# Patient Record
Sex: FEMALE | Race: WHITE | NOT HISPANIC OR LATINO | Employment: OTHER | URBAN - METROPOLITAN AREA
[De-identification: names, ages, dates, MRNs, and addresses within clinical notes are randomized per-mention and may not be internally consistent; named-entity substitution may affect disease eponyms.]

---

## 2023-08-22 ENCOUNTER — HOSPITAL ENCOUNTER (OUTPATIENT)
Dept: DATA CONVERSION | Facility: HOSPITAL | Age: 78
Discharge: HOME | End: 2023-08-22
Payer: MEDICARE

## 2023-08-22 DIAGNOSIS — K85.10 BILIARY ACUTE PANCREATITIS WITHOUT NECROSIS OR INFECTION (HHS-HCC): ICD-10-CM

## 2023-08-30 ENCOUNTER — HOSPITAL ENCOUNTER (OUTPATIENT)
Dept: DATA CONVERSION | Facility: HOSPITAL | Age: 78
Discharge: HOME | End: 2023-08-30
Payer: MEDICARE

## 2023-08-30 DIAGNOSIS — K80.20 CALCULUS OF GALLBLADDER WITHOUT CHOLECYSTITIS WITHOUT OBSTRUCTION: ICD-10-CM

## 2023-09-07 ENCOUNTER — HOSPITAL ENCOUNTER (OUTPATIENT)
Dept: DATA CONVERSION | Facility: HOSPITAL | Age: 78
Discharge: HOME | End: 2023-09-07
Payer: MEDICARE

## 2023-09-07 DIAGNOSIS — E66.9 OBESITY, UNSPECIFIED: ICD-10-CM

## 2023-09-07 DIAGNOSIS — Z72.0 TOBACCO USE: ICD-10-CM

## 2023-09-07 DIAGNOSIS — K80.10 CALCULUS OF GALLBLADDER WITH CHRONIC CHOLECYSTITIS WITHOUT OBSTRUCTION: ICD-10-CM

## 2023-09-07 DIAGNOSIS — F32.A DEPRESSION, UNSPECIFIED: ICD-10-CM

## 2023-09-07 DIAGNOSIS — K80.20 CALCULUS OF GALLBLADDER WITHOUT CHOLECYSTITIS WITHOUT OBSTRUCTION: ICD-10-CM

## 2023-09-07 DIAGNOSIS — I10 ESSENTIAL (PRIMARY) HYPERTENSION: ICD-10-CM

## 2023-09-10 PROBLEM — H61.21 IMPACTED CERUMEN OF RIGHT EAR: Status: ACTIVE | Noted: 2023-09-10

## 2023-09-10 PROBLEM — R60.9 EDEMA: Status: ACTIVE | Noted: 2023-09-10

## 2023-09-10 PROBLEM — R05.9 COUGH: Status: ACTIVE | Noted: 2023-09-10

## 2023-09-10 PROBLEM — F32.A DEPRESSION: Status: ACTIVE | Noted: 2023-09-10

## 2023-09-10 PROBLEM — H25.813 COMBINED FORM OF SENILE CATARACT OF BOTH EYES: Status: ACTIVE | Noted: 2023-09-10

## 2023-09-10 PROBLEM — H18.829 DISORDER OF CORNEA DUE TO CONTACT LENS: Status: ACTIVE | Noted: 2023-09-10

## 2023-09-10 PROBLEM — J01.90 ACUTE SINUSITIS: Status: ACTIVE | Noted: 2023-09-10

## 2023-09-10 PROBLEM — J45.909 ASTHMA (HHS-HCC): Status: ACTIVE | Noted: 2023-09-10

## 2023-09-10 PROBLEM — K21.9 GERD (GASTROESOPHAGEAL REFLUX DISEASE): Status: ACTIVE | Noted: 2023-09-10

## 2023-09-10 PROBLEM — H43.819 VITREOUS DEGENERATION: Status: ACTIVE | Noted: 2023-09-10

## 2023-09-10 PROBLEM — E78.00 PURE HYPERCHOLESTEROLEMIA: Status: ACTIVE | Noted: 2023-09-10

## 2023-09-10 PROBLEM — G47.00 INSOMNIA: Status: ACTIVE | Noted: 2023-09-10

## 2023-09-10 PROBLEM — I12.9 CHRONIC KIDNEY DISEASE DUE TO HYPERTENSION: Status: ACTIVE | Noted: 2023-09-10

## 2023-09-10 PROBLEM — J44.9 CHRONIC OBSTRUCTIVE PULMONARY DISEASE (MULTI): Status: ACTIVE | Noted: 2023-09-10

## 2023-09-10 PROBLEM — J30.2 SEASONAL ALLERGIC RHINITIS: Status: ACTIVE | Noted: 2023-09-10

## 2023-09-10 PROBLEM — M19.90 DEGENERATIVE JOINT DISEASE: Status: ACTIVE | Noted: 2023-09-10

## 2023-09-10 PROBLEM — N95.9 MENOPAUSAL AND POSTMENOPAUSAL DISORDER: Status: ACTIVE | Noted: 2023-09-10

## 2023-09-10 PROBLEM — R22.1 NECK MASS: Status: ACTIVE | Noted: 2023-09-10

## 2023-09-10 PROBLEM — H52.7 UNSPECIFIED DISORDER OF REFRACTION: Status: ACTIVE | Noted: 2023-09-10

## 2023-09-10 PROBLEM — F41.9 ANXIETY: Status: ACTIVE | Noted: 2023-09-10

## 2023-09-10 PROBLEM — H35.371 PUCKERING OF MACULA, RIGHT EYE: Status: ACTIVE | Noted: 2023-09-10

## 2023-09-10 PROBLEM — I10 BENIGN HYPERTENSION: Status: ACTIVE | Noted: 2023-09-10

## 2023-09-10 PROBLEM — R73.01 IMPAIRED FASTING GLUCOSE: Status: ACTIVE | Noted: 2023-09-10

## 2023-09-10 PROBLEM — D37.030 NEOPLASM OF UNCERTAIN BEHAVIOR OF PAROTID GLAND: Status: ACTIVE | Noted: 2021-07-10

## 2023-09-10 RX ORDER — VIT C/E/ZN/COPPR/LUTEIN/ZEAXAN 250MG-90MG
1 CAPSULE ORAL DAILY PRN
COMMUNITY

## 2023-09-10 RX ORDER — CALCIUM CARBONATE 200(500)MG
TABLET,CHEWABLE ORAL
COMMUNITY

## 2023-09-10 RX ORDER — PRAVASTATIN SODIUM 40 MG/1
1 TABLET ORAL DAILY
COMMUNITY
Start: 2013-07-19 | End: 2024-01-29

## 2023-09-10 RX ORDER — ALBUTEROL SULFATE 90 UG/1
2 AEROSOL, METERED RESPIRATORY (INHALATION) EVERY 4 HOURS PRN
COMMUNITY

## 2023-09-10 RX ORDER — METHYLPREDNISOLONE 4 MG/1
TABLET ORAL
COMMUNITY
Start: 2021-10-19

## 2023-09-10 RX ORDER — AZITHROMYCIN 250 MG/1
TABLET, FILM COATED ORAL
COMMUNITY

## 2023-09-10 RX ORDER — AZELASTINE 1 MG/ML
1 SPRAY, METERED NASAL 2 TIMES DAILY
COMMUNITY
Start: 2022-07-20

## 2023-09-10 RX ORDER — PREDNISONE 10 MG/1
TABLET ORAL
COMMUNITY

## 2023-09-10 RX ORDER — LORATADINE 10 MG/1
1 TABLET ORAL DAILY
COMMUNITY
Start: 2021-04-21

## 2023-09-10 RX ORDER — KETOCONAZOLE 20 MG/G
CREAM TOPICAL
COMMUNITY

## 2023-09-10 RX ORDER — NAPROXEN SODIUM 220 MG
2 TABLET ORAL EVERY 12 HOURS
COMMUNITY

## 2023-09-10 RX ORDER — OMEPRAZOLE 20 MG/1
1 CAPSULE, DELAYED RELEASE ORAL DAILY
COMMUNITY

## 2023-09-10 RX ORDER — SERTRALINE HYDROCHLORIDE 50 MG/1
1 TABLET, FILM COATED ORAL DAILY
COMMUNITY
Start: 2020-06-26 | End: 2024-01-29

## 2023-09-10 RX ORDER — HYDROCHLOROTHIAZIDE 12.5 MG/1
1 TABLET ORAL DAILY
COMMUNITY
Start: 2015-02-11 | End: 2024-02-02

## 2023-09-10 RX ORDER — BUDESONIDE AND FORMOTEROL FUMARATE DIHYDRATE 160; 4.5 UG/1; UG/1
2 AEROSOL RESPIRATORY (INHALATION)
COMMUNITY
Start: 2021-11-02

## 2023-09-10 RX ORDER — OMEPRAZOLE 20 MG/1
1 TABLET, DELAYED RELEASE ORAL DAILY
COMMUNITY
Start: 2021-02-15

## 2023-09-10 RX ORDER — FLUTICASONE PROPIONATE 50 MCG
1 SPRAY, SUSPENSION (ML) NASAL EVERY MORNING
COMMUNITY
Start: 2022-07-20

## 2023-09-10 RX ORDER — LOSARTAN POTASSIUM 50 MG/1
1 TABLET ORAL DAILY
COMMUNITY
End: 2024-01-29

## 2023-09-10 RX ORDER — ACETAMINOPHEN 500 MG
TABLET ORAL
COMMUNITY

## 2023-09-10 RX ORDER — BENZONATATE 100 MG/1
1 CAPSULE ORAL 3 TIMES DAILY PRN
COMMUNITY

## 2024-01-29 DIAGNOSIS — F41.9 ANXIETY: ICD-10-CM

## 2024-01-29 DIAGNOSIS — E78.00 PURE HYPERCHOLESTEROLEMIA: ICD-10-CM

## 2024-01-29 DIAGNOSIS — I10 BENIGN HYPERTENSION: ICD-10-CM

## 2024-01-29 RX ORDER — SERTRALINE HYDROCHLORIDE 50 MG/1
50 TABLET, FILM COATED ORAL DAILY
Qty: 90 TABLET | Refills: 3 | Status: SHIPPED | OUTPATIENT
Start: 2024-01-29 | End: 2024-03-08 | Stop reason: SDUPTHER

## 2024-01-29 RX ORDER — LOSARTAN POTASSIUM 50 MG/1
50 TABLET ORAL DAILY
Qty: 90 TABLET | Refills: 3 | Status: SHIPPED | OUTPATIENT
Start: 2024-01-29 | End: 2024-03-08 | Stop reason: SDUPTHER

## 2024-01-29 RX ORDER — PRAVASTATIN SODIUM 40 MG/1
40 TABLET ORAL DAILY
Qty: 90 TABLET | Refills: 3 | Status: SHIPPED | OUTPATIENT
Start: 2024-01-29 | End: 2024-03-08 | Stop reason: SDUPTHER

## 2024-02-02 DIAGNOSIS — I12.9 CHRONIC KIDNEY DISEASE DUE TO HYPERTENSION: ICD-10-CM

## 2024-02-02 RX ORDER — HYDROCHLOROTHIAZIDE 12.5 MG/1
12.5 TABLET ORAL DAILY
Qty: 90 TABLET | Refills: 2 | Status: SHIPPED | OUTPATIENT
Start: 2024-02-02 | End: 2024-03-08 | Stop reason: SDUPTHER

## 2024-02-16 ENCOUNTER — TELEPHONE (OUTPATIENT)
Dept: PRIMARY CARE | Facility: CLINIC | Age: 79
End: 2024-02-16
Payer: MEDICARE

## 2024-02-16 NOTE — TELEPHONE ENCOUNTER
Pt c/o arthritis pain in left shoulder and arm x3 weeks.  Cannot put coat on, trouble sleeping.  Wondering if could try meloxicam or what you recommend.  Please advise.  Ph: [phone]

## 2024-02-26 ENCOUNTER — HOSPITAL ENCOUNTER (OUTPATIENT)
Dept: RADIOLOGY | Facility: CLINIC | Age: 79
Discharge: HOME | End: 2024-02-26
Payer: MEDICARE

## 2024-02-26 DIAGNOSIS — M25.512 LEFT SHOULDER PAIN, UNSPECIFIED CHRONICITY: ICD-10-CM

## 2024-02-26 PROCEDURE — 73030 X-RAY EXAM OF SHOULDER: CPT | Mod: LEFT SIDE | Performed by: RADIOLOGY

## 2024-02-26 PROCEDURE — 73030 X-RAY EXAM OF SHOULDER: CPT | Mod: LT

## 2024-02-27 ENCOUNTER — OFFICE VISIT (OUTPATIENT)
Dept: ORTHOPEDIC SURGERY | Facility: CLINIC | Age: 79
End: 2024-02-27
Payer: MEDICARE

## 2024-02-27 DIAGNOSIS — Z96.653 HISTORY OF TOTAL BILATERAL KNEE REPLACEMENT: ICD-10-CM

## 2024-02-27 DIAGNOSIS — J44.9 CHRONIC OBSTRUCTIVE PULMONARY DISEASE, UNSPECIFIED COPD TYPE (MULTI): ICD-10-CM

## 2024-02-27 DIAGNOSIS — I10 BENIGN HYPERTENSION: ICD-10-CM

## 2024-02-27 DIAGNOSIS — I12.9 CHRONIC KIDNEY DISEASE DUE TO HYPERTENSION: ICD-10-CM

## 2024-02-27 DIAGNOSIS — E78.00 PURE HYPERCHOLESTEROLEMIA: ICD-10-CM

## 2024-02-27 DIAGNOSIS — M25.512 LEFT SHOULDER PAIN, UNSPECIFIED CHRONICITY: ICD-10-CM

## 2024-02-27 DIAGNOSIS — M19.012 PRIMARY OSTEOARTHRITIS OF LEFT SHOULDER: Primary | ICD-10-CM

## 2024-02-27 PROCEDURE — 2500000004 HC RX 250 GENERAL PHARMACY W/ HCPCS (ALT 636 FOR OP/ED): Performed by: ORTHOPAEDIC SURGERY

## 2024-02-27 PROCEDURE — 99214 OFFICE O/P EST MOD 30 MIN: CPT | Performed by: ORTHOPAEDIC SURGERY

## 2024-02-27 PROCEDURE — 1160F RVW MEDS BY RX/DR IN RCRD: CPT | Performed by: ORTHOPAEDIC SURGERY

## 2024-02-27 PROCEDURE — 20611 DRAIN/INJ JOINT/BURSA W/US: CPT | Performed by: ORTHOPAEDIC SURGERY

## 2024-02-27 PROCEDURE — 2500000005 HC RX 250 GENERAL PHARMACY W/O HCPCS: Performed by: ORTHOPAEDIC SURGERY

## 2024-02-27 PROCEDURE — 1159F MED LIST DOCD IN RCRD: CPT | Performed by: ORTHOPAEDIC SURGERY

## 2024-02-27 PROCEDURE — 1125F AMNT PAIN NOTED PAIN PRSNT: CPT | Performed by: ORTHOPAEDIC SURGERY

## 2024-02-27 PROCEDURE — 99204 OFFICE O/P NEW MOD 45 MIN: CPT | Performed by: ORTHOPAEDIC SURGERY

## 2024-02-27 RX ORDER — LIDOCAINE HYDROCHLORIDE 10 MG/ML
0.5 INJECTION INFILTRATION; PERINEURAL
Status: COMPLETED | OUTPATIENT
Start: 2024-02-27 | End: 2024-02-27

## 2024-02-27 RX ORDER — TRIAMCINOLONE ACETONIDE 40 MG/ML
40 INJECTION, SUSPENSION INTRA-ARTICULAR; INTRAMUSCULAR
Status: COMPLETED | OUTPATIENT
Start: 2024-02-27 | End: 2024-02-27

## 2024-02-27 RX ADMIN — LIDOCAINE HYDROCHLORIDE 0.5 ML: 10 INJECTION, SOLUTION INFILTRATION; PERINEURAL at 08:31

## 2024-02-27 RX ADMIN — TRIAMCINOLONE ACETONIDE 40 MG: 40 INJECTION, SUSPENSION INTRA-ARTICULAR; INTRAMUSCULAR at 08:31

## 2024-02-27 ASSESSMENT — ENCOUNTER SYMPTOMS
FEVER: 0
WHEEZING: 0
ARTHRALGIAS: 1
CHILLS: 0
SHORTNESS OF BREATH: 0
FATIGUE: 0

## 2024-02-27 ASSESSMENT — PAIN SCALES - GENERAL: PAINLEVEL_OUTOF10: 8

## 2024-02-27 ASSESSMENT — PAIN - FUNCTIONAL ASSESSMENT: PAIN_FUNCTIONAL_ASSESSMENT: 0-10

## 2024-02-27 NOTE — PROGRESS NOTES
Reason for Appointment  Chief Complaint   Patient presents with    Left Shoulder - Pain     History of Present Illness  New patient is a 79 y.o. female here today for evaluation of left shoulder pain. X-rays today show severe osteoarthritis with a well centered humeral head. She has posterior shoulder pain that is worse when she raises the arm. Occasionally she has pain that shoots from the neck and into the thumb in the C6 region. Her shoulder has been bothering her for a while. Pain prevents her from overhead activities. She has a history of bilateral knee replacements.     Past Medical History:   Diagnosis Date    Personal history of other diseases of the circulatory system     History of hypertension       Past Surgical History:   Procedure Laterality Date    OTHER SURGICAL HISTORY  04/19/2021    Oral surgery    OTHER SURGICAL HISTORY  04/19/2021    Knee surgery       Medication Documentation Review Audit       Reviewed by Shelley Mccabe MA (Medical Assistant) on 02/27/24 at 0810      Medication Order Taking? Sig Documenting Provider Last Dose Status   acetaminophen (Tylenol) 325 mg tablet [id number] Yes TAKE 2 TABLETS BY MOUTH EVERY 4 HOURS AS NEEDED FOR PAIN Owen Sierra MD Taking Active   acetaminophen (Tylenol) 500 mg tablet [id number] Yes Take by mouth. Historical Provider, MD Taking Active   albuterol 90 mcg/actuation inhaler [id number] Yes Inhale 2 puffs every 4 hours if needed. Historical Provider, MD Taking Active   azelastine (Astelin) 137 mcg (0.1 %) nasal spray [id number] Yes Administer 1 spray into each nostril 2 times a day. Historical Provider, MD Taking Active   azithromycin (Zithromax) 250 mg tablet [id number] Yes TK 1 T PO Q MON WED AND FRI Historical Provider, MD Taking Active   benzonatate (Tessalon) 100 mg capsule [id number] Yes Take 1 capsule (100 mg) by mouth 3 times a day as needed. Historical Provider, MD Taking Active   budesonide-formoteroL (Symbicort) 160-4.5 mcg/actuation inhaler  [id number] Yes Inhale 2 puffs. rinse mouth after use Historical Provider, MD Taking Active   calcium carbonate (Tums) 200 mg calcium chewable tablet [id number] Yes Chew. Historical Provider, MD Taking Active   cetirizine HCl (ZYRTEC ORAL) [id number] Yes Take by mouth. Historical MD Bijal Taking Active   cholecalciferol (Vitamin D-3) 25 MCG (1000 UT) capsule [id number] Yes Take 1 capsule (25 mcg) by mouth once daily as needed. Historical MD Bijal Taking Active   fluticasone (Flonase) 50 mcg/actuation nasal spray [id number] Yes Administer 1 spray into each nostril once daily in the morning. Historical MD Bijal Taking Active   hydroCHLOROthiazide (HYDRODiuril) 12.5 mg tablet [id number] Yes TAKE 1 TABLET BY MOUTH ONCE  DAILY Jared Jones MD Taking Active   ibuprofen 600 mg tablet [id number] Yes TAKE 1 TABLET BY MOUTH EVERY 6 HOURS AS NEEDED FOR PAIN Owen Sierra MD Taking Active   ketoconazole (NIZOral) 2 % cream [id number] Yes Apply topically. Historical MD Bijal Taking Active   loratadine (Claritin) 10 mg tablet [id number] Yes Take 1 half tablet by mouth once daily. Historical MD Bijal Taking Active   losartan (Cozaar) 50 mg tablet [id number] Yes TAKE 1 TABLET BY MOUTH ONCE  DAILY Jared Jones MD Taking Active   methylPREDNISolone (Medrol Dospak) 4 mg tablets [id number] Yes Take by mouth. Historical MD Bijal Taking Active   naproxen sodium (Aleve) 220 mg tablet [id number] Yes Take 2 tablets (440 mg) by mouth every 12 hours. Agapito Appiah MD Taking Active   omeprazole (PriLOSEC) 20 mg DR capsule [id number] Yes Take 1 capsule (20 mg) by mouth once daily. Agapito Appiah MD Taking Active   omeprazole OTC (PriLOSEC OTC) 20 mg EC tablet [id number] Yes Take 1 tablet (20 mg) by mouth once daily. Agapito Appiah MD Taking Active   oxyCODONE (Roxicodone) 5 mg immediate release tablet [id number] Yes TAKE 1 TABLET BY MOUTH EVERY 6 HOURS AS NEEDED FOR PAIN NOT RELIEVED BY ACETAMINOPHEN  AND IBUPROFEN MAX DAILYDOSE - 4 Owen Sierra MD Taking Active   pravastatin (Pravachol) 40 mg tablet [id number] Yes TAKE 1 TABLET BY MOUTH DAILY Jared Jones MD Taking Active   predniSONE (Deltasone) 10 mg tablet [id number] Yes  Historical Provider, MD Taking Active   sertraline (Zoloft) 50 mg tablet [id number] Yes TAKE 1 TABLET BY MOUTH ONCE  DAILY Jared Jones MD Taking Active                    Allergies   Allergen Reactions    Ciprofloxacin Anaphylaxis     Hives, rash, swelling    Levofloxacin Anaphylaxis    Morphine Anaphylaxis     Severe anaphylaxis    Moxifloxacin Anaphylaxis     Hives, respiratory distress    Penicillin Anaphylaxis     Hives, respiratory distress    Atorvastatin Myalgia    Erythromycin Other     Stomach upset, stabbing pains     Iodinated Contrast Media Hives    Oxycodone-Acetaminophen Other     nightmares       Review of Systems   Constitutional:  Negative for chills, fatigue and fever.   Respiratory:  Negative for shortness of breath and wheezing.    Cardiovascular:  Negative for chest pain and leg swelling.   Musculoskeletal:  Positive for arthralgias.   All other systems reviewed and are negative.      Exam   On exam, there is good cervical ROM, periscapular and trapezial tenderness, no masses, no AC or SC joint tenderness, hard to elevate the arm above 100 degrees, tender over the joint line, mildly positive impingement sign, mild weakness in external rotation, good biceps and triceps function, good elbow ROM, good wrist flexion and extension, mild arthritic changes in the hands, good pulses, good sensation, no skin changes, no triggering, no hyperreflexia, negative Riccardo's sign    Assessment   Encounter Diagnoses   Name Primary?    Left shoulder pain, unspecified chronicity     Chronic obstructive pulmonary disease, unspecified COPD type (CMS/HCC)     Benign hypertension     Pure hypercholesterolemia     Chronic kidney disease due to hypertension     History of total  bilateral knee replacement     Primary osteoarthritis of left shoulder Yes     Plan   Today we discussed conservative treatment. At this point, the patient is experiencing increased left shoulder pain that is consistent with osteoarthritis on clinical examination and X-ray with tenderness over the joint line. We will do one cortisone injection into the left shoulder joint in hopes to calm their symptoms nicely. Pt understands the small risk of infection and warning signs including flare reaction. We did discuss future injections versus reverse shoulder replacement when this injection wears off. A shoulder replacement would be the only long-term solution but she is not interested in surgery at present.     Patient ID: Sue Gar is a 79 y.o. female.    L Inj/Asp: L glenohumeral on 2/27/2024 8:31 AM  Indications: pain  Details: 22 G needle, ultrasound-guided  Medications: 40 mg triamcinolone acetonide 40 mg/mL; 0.5 mL lidocaine 10 mg/mL (1 %)  Outcome: tolerated well, no immediate complications    After discussing the risks and benefits of the procedure we proceeded with the injection. Using ultrasound guidance we anteriorly identified the coracoid process, glenoid and humeral head, also identified the glenohumeral head joint space, images obtained.  We sterilely injected a mixture of 40 mg of Kenalog and 1 cc of 1% lidocaine into the left shoulder joint. Pt tolerated the procedure well without any adverse effects.    Procedure, treatment alternatives, risks and benefits explained, specific risks discussed. Consent was given by the patient. Immediately prior to procedure a time out was called to verify the correct patient, procedure, equipment, support staff and site/side marked as required. Patient was prepped and draped in the usual sterile fashion.         Concha ROBISON, attkiel that this documentation has been prepared under the direction and in the presence of Manuel Phoenix MD. By signing below, Manuel ROBISON  MD Alban, personally performed the services described in this documentation. All medical record entries made by the scribe were at my direction and in my presence. I have reviewed the chart and agree that the record reflects my personal performance and is accurate and complete. 02/27/24

## 2024-02-28 ENCOUNTER — TELEPHONE (OUTPATIENT)
Dept: ORTHOPEDIC SURGERY | Facility: CLINIC | Age: 79
End: 2024-02-28
Payer: MEDICARE

## 2024-02-28 NOTE — TELEPHONE ENCOUNTER
Likely slight flush reaction from injection, should pass over next 24 hours, she can take tylenol/ibuprofen for headache if able

## 2024-03-08 ENCOUNTER — TELEPHONE (OUTPATIENT)
Dept: PRIMARY CARE | Facility: CLINIC | Age: 79
End: 2024-03-08
Payer: MEDICARE

## 2024-03-08 DIAGNOSIS — I10 BENIGN HYPERTENSION: ICD-10-CM

## 2024-03-08 DIAGNOSIS — E78.00 PURE HYPERCHOLESTEROLEMIA: ICD-10-CM

## 2024-03-08 DIAGNOSIS — I12.9 CHRONIC KIDNEY DISEASE DUE TO HYPERTENSION: ICD-10-CM

## 2024-03-08 DIAGNOSIS — F41.9 ANXIETY: ICD-10-CM

## 2024-03-08 NOTE — TELEPHONE ENCOUNTER
LV 9/1/23  NV  5/21/24    Sertraline 50mg  Losartan 50mg  Pravastation 40mg  Hydrochlorothiazide 12.5mg     Harry [address] [facility] [place]

## 2024-03-09 RX ORDER — SERTRALINE HYDROCHLORIDE 50 MG/1
50 TABLET, FILM COATED ORAL DAILY
Qty: 90 TABLET | Refills: 2 | Status: SHIPPED | OUTPATIENT
Start: 2024-03-09

## 2024-03-09 RX ORDER — LOSARTAN POTASSIUM 50 MG/1
50 TABLET ORAL DAILY
Qty: 90 TABLET | Refills: 2 | Status: SHIPPED | OUTPATIENT
Start: 2024-03-09

## 2024-03-09 RX ORDER — HYDROCHLOROTHIAZIDE 12.5 MG/1
12.5 TABLET ORAL DAILY
Qty: 90 TABLET | Refills: 2 | Status: SHIPPED | OUTPATIENT
Start: 2024-03-09

## 2024-03-09 RX ORDER — PRAVASTATIN SODIUM 40 MG/1
40 TABLET ORAL DAILY
Qty: 90 TABLET | Refills: 2 | Status: SHIPPED | OUTPATIENT
Start: 2024-03-09

## 2024-05-17 ENCOUNTER — APPOINTMENT (OUTPATIENT)
Dept: OPHTHALMOLOGY | Facility: CLINIC | Age: 79
End: 2024-05-17
Payer: MEDICARE

## 2024-05-23 ENCOUNTER — TELEPHONE (OUTPATIENT)
Dept: PRIMARY CARE | Facility: CLINIC | Age: 79
End: 2024-05-23
Payer: MEDICARE

## 2024-05-23 NOTE — TELEPHONE ENCOUNTER
Pt wants WRITTEN Rx's for   Losartan 10 mg  Hydrochlorothiazide 12.5 mg  Pravastatin 40 mg  Sertraline 50 mg     Pt will see ANANTH Jones 6/3 and she will wait til then.

## 2024-05-28 ENCOUNTER — OFFICE VISIT (OUTPATIENT)
Dept: ORTHOPEDIC SURGERY | Facility: CLINIC | Age: 79
End: 2024-05-28
Payer: MEDICARE

## 2024-05-28 DIAGNOSIS — M25.812 IMPINGEMENT OF LEFT SHOULDER: Primary | ICD-10-CM

## 2024-05-28 PROCEDURE — 99213 OFFICE O/P EST LOW 20 MIN: CPT | Performed by: ORTHOPAEDIC SURGERY

## 2024-05-28 PROCEDURE — 2500000004 HC RX 250 GENERAL PHARMACY W/ HCPCS (ALT 636 FOR OP/ED): Performed by: ORTHOPAEDIC SURGERY

## 2024-05-28 PROCEDURE — 2500000005 HC RX 250 GENERAL PHARMACY W/O HCPCS: Performed by: ORTHOPAEDIC SURGERY

## 2024-05-28 PROCEDURE — 20611 DRAIN/INJ JOINT/BURSA W/US: CPT | Performed by: ORTHOPAEDIC SURGERY

## 2024-05-28 PROCEDURE — 1159F MED LIST DOCD IN RCRD: CPT | Performed by: ORTHOPAEDIC SURGERY

## 2024-05-28 PROCEDURE — 1160F RVW MEDS BY RX/DR IN RCRD: CPT | Performed by: ORTHOPAEDIC SURGERY

## 2024-05-28 RX ORDER — LIDOCAINE HYDROCHLORIDE 10 MG/ML
3 INJECTION INFILTRATION; PERINEURAL
Status: COMPLETED | OUTPATIENT
Start: 2024-05-28 | End: 2024-05-28

## 2024-05-28 RX ADMIN — TRIAMCINOLONE ACETONIDE 30 MG: 10 INJECTION, SUSPENSION INTRA-ARTICULAR; INTRALESIONAL at 10:39

## 2024-05-28 RX ADMIN — LIDOCAINE HYDROCHLORIDE 3 ML: 10 INJECTION, SOLUTION INFILTRATION; PERINEURAL at 10:39

## 2024-05-28 ASSESSMENT — ENCOUNTER SYMPTOMS
COLOR CHANGE: 0
EYE DISCHARGE: 0
FEVER: 0
ARTHRALGIAS: 1
SHORTNESS OF BREATH: 0
NECK PAIN: 1
TROUBLE SWALLOWING: 0
JOINT SWELLING: 1
CHILLS: 0
WHEEZING: 0

## 2024-05-28 ASSESSMENT — PAIN SCALES - GENERAL: PAINLEVEL_OUTOF10: 10 - WORST POSSIBLE PAIN

## 2024-05-28 ASSESSMENT — PAIN - FUNCTIONAL ASSESSMENT: PAIN_FUNCTIONAL_ASSESSMENT: 0-10

## 2024-05-28 NOTE — PROGRESS NOTES
Reason for Appointment  Recurrent L shoulder pain    History of Present Illness  Patient is a 79 y.o. female here today for follow-up evaluation of recurrent left shoulder pain.  Previous x-rays have shown severe glenohumeral joint arthritis.  She had a previous joint injection that did not give her much relief.  Most of her pain is posterior and lateral, difficulty sleeping and doing any overhead activity.  She is not interested in any surgery at this point, she would like to try another injection.  No other changes in her past medical history, allergies, or medications.    Past Medical History:   Diagnosis Date    Personal history of other diseases of the circulatory system     History of hypertension       Past Surgical History:   Procedure Laterality Date    OTHER SURGICAL HISTORY  04/19/2021    Oral surgery    OTHER SURGICAL HISTORY  04/19/2021    Knee surgery       Medication Documentation Review Audit       Reviewed by Manuel Phoenix MD (Physician) on 02/27/24 at 1121      Medication Order Taking? Sig Documenting Provider Last Dose Status   acetaminophen (Tylenol) 325 mg tablet [id number] Yes TAKE 2 TABLETS BY MOUTH EVERY 4 HOURS AS NEEDED FOR PAIN Owen Sierra MD Taking Active   acetaminophen (Tylenol) 500 mg tablet [id number] Yes Take by mouth. Historical Provider, MD Taking Active   albuterol 90 mcg/actuation inhaler [id number] Yes Inhale 2 puffs every 4 hours if needed. Historical Provider, MD Taking Active   azelastine (Astelin) 137 mcg (0.1 %) nasal spray [id number] Yes Administer 1 spray into each nostril 2 times a day. Historical Provider, MD Taking Active   azithromycin (Zithromax) 250 mg tablet [id number] Yes TK 1 T PO Q MON WED AND FRI Historical Provider, MD Taking Active   benzonatate (Tessalon) 100 mg capsule [id number] Yes Take 1 capsule (100 mg) by mouth 3 times a day as needed. Historical Provider, MD Taking Active   budesonide-formoteroL (Symbicort) 160-4.5 mcg/actuation inhaler [id number] Yes  Inhale 2 puffs. rinse mouth after use Historical MD Bijal Taking Active   calcium carbonate (Tums) 200 mg calcium chewable tablet [id number] Yes Chew. Historical MD Bijal Taking Active   cetirizine HCl (ZYRTEC ORAL) [id number] Yes Take by mouth. Historical MD Bijal Taking Active   cholecalciferol (Vitamin D-3) 25 MCG (1000 UT) capsule [id number] Yes Take 1 capsule (25 mcg) by mouth once daily as needed. Historical MD Bijal Taking Active   fluticasone (Flonase) 50 mcg/actuation nasal spray [id number] Yes Administer 1 spray into each nostril once daily in the morning. Historical MD Bijal Taking Active   hydroCHLOROthiazide (HYDRODiuril) 12.5 mg tablet [id number] Yes TAKE 1 TABLET BY MOUTH ONCE  DAILY Jared Jones MD Taking Active   ibuprofen 600 mg tablet [id number] Yes TAKE 1 TABLET BY MOUTH EVERY 6 HOURS AS NEEDED FOR PAIN Owen Sierra MD Taking Active   ketoconazole (NIZOral) 2 % cream [id number] Yes Apply topically. Historical MD Bijal Taking Active   loratadine (Claritin) 10 mg tablet [id number] Yes Take 1 half tablet by mouth once daily. Historical MD Bijal Taking Active   losartan (Cozaar) 50 mg tablet [id number] Yes TAKE 1 TABLET BY MOUTH ONCE  DAILY Jared Jones MD Taking Active   methylPREDNISolone (Medrol Dospak) 4 mg tablets [id number] Yes Take by mouth. Historical MD Bijal Taking Active   naproxen sodium (Aleve) 220 mg tablet [id number] Yes Take 2 tablets (440 mg) by mouth every 12 hours. Agapito Appiah MD Taking Active   omeprazole (PriLOSEC) 20 mg DR capsule [id number] Yes Take 1 capsule (20 mg) by mouth once daily. Agapito Appiah MD Taking Active   omeprazole OTC (PriLOSEC OTC) 20 mg EC tablet [id number] Yes Take 1 tablet (20 mg) by mouth once daily. Agapito Appiah MD Taking Active   oxyCODONE (Roxicodone) 5 mg immediate release tablet [id number] Yes TAKE 1 TABLET BY MOUTH EVERY 6 HOURS AS NEEDED FOR PAIN NOT RELIEVED BY ACETAMINOPHEN AND IBUPROFEN  MAX DAILYDOSE - 4 Owen Sierra MD Taking Active   pravastatin (Pravachol) 40 mg tablet [id number] Yes TAKE 1 TABLET BY MOUTH DAILY Jared Jones MD Taking Active   predniSONE (Deltasone) 10 mg tablet [id number] Yes  Historical Provider, MD Taking Active   sertraline (Zoloft) 50 mg tablet [id number] Yes TAKE 1 TABLET BY MOUTH ONCE  DAILY Jared Jones MD Taking Active                    Allergies   Allergen Reactions    Ciprofloxacin Anaphylaxis     Hives, rash, swelling    Levofloxacin Anaphylaxis    Morphine Anaphylaxis     Severe anaphylaxis    Moxifloxacin Anaphylaxis     Hives, respiratory distress    Penicillin Anaphylaxis     Hives, respiratory distress    Atorvastatin Myalgia    Erythromycin Other     Stomach upset, stabbing pains     Iodinated Contrast Media Hives    Oxycodone-Acetaminophen Other     nightmares       Review of Systems   Constitutional:  Negative for chills and fever.   HENT:  Negative for mouth sores, postnasal drip and trouble swallowing.    Eyes:  Negative for discharge.   Respiratory:  Negative for shortness of breath and wheezing.    Cardiovascular:  Negative for chest pain.   Musculoskeletal:  Positive for arthralgias, joint swelling and neck pain.   Skin:  Negative for color change and pallor.   All other systems reviewed and are negative.    Exam   On exam the left shoulder only shows about 120 degrees of active forward flexion today, she has positive impingement signs on the left and some mild weakness with resisted external rotation.  Deltoid is functional.  She does have some mild left trapezial pain as well.  Good pulses and sensation in the upper extremity.    Assessment   Left shoulder impingement    Plan   We we will try an injection today into the subacromial space to see if this gives her better relief.  We sterilely injected under ultrasound guidance Kenalog and lidocaine in the left shoulder subacromial space.  Patient understands the small risk of infection and  the signs look for as well as for reaction.  Hopefully this gives her good relief, we have discussed again a reverse shoulder replacement in the future but she would like to avoid this.  She can follow-up with us as needed.    L Inj/Asp: L subacromial bursa on 5/28/2024 10:39 AM  Indications: pain  Details: 22 G needle, ultrasound-guided  Medications: 3 mL lidocaine 10 mg/mL (1 %); 30 mg triamcinolone acetonide 10 mg/mL  Outcome: tolerated well, no immediate complications    After discussing the risks and benefits of the procedure with proceeded with an injection.  Using ultrasound guidance we identified the acromion, humeral head and the subacromial bursa, images obtained. We then sterilely injected the left subacrominal space with a mixture of 30 mg of Kenalog and 2 cc of 1 % lidocaine. Pt tolerated the procedure well without any adverse reactions.   Procedure, treatment alternatives, risks and benefits explained, specific risks discussed. Consent was given by the patient. Immediately prior to procedure a time out was called to verify the correct patient, procedure, equipment, support staff and site/side marked as required. Patient was prepped and draped in the usual sterile fashion.       Written by Maria L Phoenix saw, evaluated, and treated the patient with the PA

## 2024-05-30 ENCOUNTER — APPOINTMENT (OUTPATIENT)
Dept: OPHTHALMOLOGY | Facility: CLINIC | Age: 79
End: 2024-05-30
Payer: MEDICARE

## 2024-06-03 PROBLEM — E66.9 OBESITY WITH BODY MASS INDEX 30 OR GREATER: Status: ACTIVE | Noted: 2024-06-03

## 2024-06-03 PROBLEM — K85.10 GALLSTONE PANCREATITIS (HHS-HCC): Status: ACTIVE | Noted: 2023-08-22

## 2024-06-03 PROBLEM — M81.8 PRIMARY OSTEOPOROSIS ASSOCIATED WITH MUTATION IN LRP5 GENE: Status: ACTIVE | Noted: 2024-06-03

## 2024-06-03 PROBLEM — R60.0 EDEMA OF BOTH LOWER EXTREMITIES: Status: ACTIVE | Noted: 2024-06-03

## 2024-06-03 PROBLEM — H18.823 CORNEAL DISORDER DUE TO CONTACT LENS, BILATERAL: Status: ACTIVE | Noted: 2024-06-03

## 2024-06-03 PROBLEM — H61.21 IMPACTED CERUMEN OF RIGHT EAR: Status: RESOLVED | Noted: 2023-09-10 | Resolved: 2024-06-03

## 2024-06-03 PROBLEM — K80.10 CALCULUS OF GALLBLADDER WITH CHRONIC CHOLECYSTITIS WITHOUT OBSTRUCTION: Status: ACTIVE | Noted: 2024-06-03

## 2024-06-03 PROBLEM — H25.9 AGE-RELATED CATARACT OF BOTH EYES: Status: ACTIVE | Noted: 2023-09-10

## 2024-06-03 PROBLEM — E87.1 HYPO-OSMOLALITY AND HYPONATREMIA: Status: ACTIVE | Noted: 2023-05-18

## 2024-07-15 RX ORDER — ZOLPIDEM TARTRATE 10 MG/1
TABLET ORAL DAILY PRN
COMMUNITY
End: 2024-07-16 | Stop reason: ALTCHOICE

## 2024-07-15 RX ORDER — HYDROGEN PEROXIDE 3 %
SOLUTION, NON-ORAL MISCELLANEOUS
COMMUNITY
End: 2024-07-16 | Stop reason: ALTCHOICE

## 2024-07-15 RX ORDER — MONTELUKAST SODIUM 10 MG/1
10 TABLET ORAL
COMMUNITY
End: 2024-07-16 | Stop reason: ALTCHOICE

## 2024-07-15 RX ORDER — CLINDAMYCIN HYDROCHLORIDE 300 MG/1
CAPSULE ORAL
COMMUNITY
Start: 2023-10-20

## 2024-07-16 ENCOUNTER — OFFICE VISIT (OUTPATIENT)
Dept: PRIMARY CARE | Facility: CLINIC | Age: 79
End: 2024-07-16
Payer: MEDICARE

## 2024-07-16 VITALS
OXYGEN SATURATION: 96 % | HEART RATE: 85 BPM | BODY MASS INDEX: 35.97 KG/M2 | TEMPERATURE: 97.5 F | HEIGHT: 63 IN | WEIGHT: 203 LBS | DIASTOLIC BLOOD PRESSURE: 66 MMHG | SYSTOLIC BLOOD PRESSURE: 122 MMHG

## 2024-07-16 DIAGNOSIS — Z76.89 ENCOUNTER TO ESTABLISH CARE WITH NEW DOCTOR: Primary | ICD-10-CM

## 2024-07-16 DIAGNOSIS — Z01.89 ENCOUNTER FOR ROUTINE LABORATORY TESTING: ICD-10-CM

## 2024-07-16 DIAGNOSIS — Z13.820 ENCOUNTER FOR SCREENING FOR OSTEOPOROSIS: ICD-10-CM

## 2024-07-16 DIAGNOSIS — Z12.31 ENCOUNTER FOR SCREENING MAMMOGRAM FOR BREAST CANCER: ICD-10-CM

## 2024-07-16 DIAGNOSIS — R79.9 ABNORMAL FINDING OF BLOOD CHEMISTRY, UNSPECIFIED: ICD-10-CM

## 2024-07-16 DIAGNOSIS — M81.0 AGE-RELATED OSTEOPOROSIS WITHOUT CURRENT PATHOLOGICAL FRACTURE: ICD-10-CM

## 2024-07-16 DIAGNOSIS — I10 BENIGN HYPERTENSION: ICD-10-CM

## 2024-07-16 DIAGNOSIS — E78.00 PURE HYPERCHOLESTEROLEMIA: ICD-10-CM

## 2024-07-16 DIAGNOSIS — I12.9 CHRONIC KIDNEY DISEASE DUE TO HYPERTENSION: ICD-10-CM

## 2024-07-16 DIAGNOSIS — F41.9 ANXIETY: ICD-10-CM

## 2024-07-16 DIAGNOSIS — E55.9 VITAMIN D DEFICIENCY: ICD-10-CM

## 2024-07-16 PROCEDURE — 3078F DIAST BP <80 MM HG: CPT | Performed by: INTERNAL MEDICINE

## 2024-07-16 PROCEDURE — 3074F SYST BP LT 130 MM HG: CPT | Performed by: INTERNAL MEDICINE

## 2024-07-16 PROCEDURE — 99214 OFFICE O/P EST MOD 30 MIN: CPT | Performed by: INTERNAL MEDICINE

## 2024-07-16 PROCEDURE — 1126F AMNT PAIN NOTED NONE PRSNT: CPT | Performed by: INTERNAL MEDICINE

## 2024-07-16 PROCEDURE — 1159F MED LIST DOCD IN RCRD: CPT | Performed by: INTERNAL MEDICINE

## 2024-07-16 RX ORDER — PRAVASTATIN SODIUM 40 MG/1
40 TABLET ORAL DAILY
Qty: 100 TABLET | Refills: 1 | Status: SHIPPED | OUTPATIENT
Start: 2024-07-16

## 2024-07-16 RX ORDER — HYDROCHLOROTHIAZIDE 12.5 MG/1
12.5 TABLET ORAL DAILY
Qty: 100 TABLET | Refills: 1 | Status: SHIPPED | OUTPATIENT
Start: 2024-07-16

## 2024-07-16 RX ORDER — SERTRALINE HYDROCHLORIDE 50 MG/1
50 TABLET, FILM COATED ORAL DAILY
Qty: 100 TABLET | Refills: 1 | Status: SHIPPED | OUTPATIENT
Start: 2024-07-16

## 2024-07-16 RX ORDER — LOSARTAN POTASSIUM 50 MG/1
50 TABLET ORAL DAILY
Qty: 100 TABLET | Refills: 1 | Status: SHIPPED | OUTPATIENT
Start: 2024-07-16

## 2024-07-16 ASSESSMENT — PATIENT HEALTH QUESTIONNAIRE - PHQ9
SUM OF ALL RESPONSES TO PHQ9 QUESTIONS 1 AND 2: 0
2. FEELING DOWN, DEPRESSED OR HOPELESS: NOT AT ALL
1. LITTLE INTEREST OR PLEASURE IN DOING THINGS: NOT AT ALL

## 2024-07-16 ASSESSMENT — PAIN SCALES - GENERAL: PAINLEVEL: 0-NO PAIN

## 2024-07-16 NOTE — PROGRESS NOTES
[facility]: [facility]  PROGRESS NOTE      Sue Gar is a 79 y.o. female that is presenting today for New Patient Visit.    Assessment/Plan   Diagnoses and all orders for this visit:  Encounter to establish care with new doctor      - Reviewed patient's available records, discussed PMH, Current active problems Meds and allergies.  Benign hypertension     Under control with current treatment   Continue the same   Rx E-scripted 100 days x 1  -     losartan (Cozaar) 50 mg tablet; Take 1 tablet (50 mg) by mouth once daily.  -     Comprehensive Metabolic Panel; Future  Pure hypercholesterolemia     Under control with current treatment   Continue the same   Rx E-scripted 100 days x 1  -     pravastatin (Pravachol) 40 mg tablet; Take 1 tablet (40 mg) by mouth once daily.  -     Lipid Panel; Future  Chronic kidney disease due to hypertension      - Stable / Continue to monitor    - Stressed importance of hydration and avoiding Nephrotoxic meds mostly OTC NSAIDs  -     hydroCHLOROthiazide (Microzide) 12.5 mg tablet; Take 1 tablet (12.5 mg) by mouth once daily.  Anxiety    Under control with current treatment   Continue the same   Rx E-scripted 100 days x 1  -     sertraline (Zoloft) 50 mg tablet; Take 1 tablet (50 mg) by mouth once daily.  -     TSH with reflex to Free T4 if abnormal; Future  Encounter for screening mammogram for breast cancer  -     BI mammo bilateral screening tomosynthesis; Future  Encounter for routine laboratory testing  -     Comprehensive Metabolic Panel; Future  -     CBC and Auto Differential; Future  -     Lipid Panel; Future  -     Hemoglobin A1C; Future  -     Vitamin D 25-Hydroxy,Total (for eval of Vitamin D levels); Future  -     TSH with reflex to Free T4 if abnormal; Future  Vitamin D deficiency  -     Vitamin D 25-Hydroxy,Total (for eval of Vitamin D levels); Future  Age-related osteoporosis without current pathological fracture  -     XR DEXA bone density;  Future  Abnormal finding of blood chemistry, unspecified  -     CBC and Auto Differential; Future  -     Hemoglobin A1C; Future  Other orders  -     Follow Up In Primary Care; Future  Subjective     - Sue LINCOLN Wittkhadijah 79 y.o. female is here today to establish care (TE), Fuv and refills       - Patient denies any symptoms or concerns at this time.       - patient denies any adverse reactions to or concerns with his/her meds.       - Problem list and medication reconciliation done today.  - V.S. Stable. No changes at this time.  - Encouraged continued diet and exercise modification    Review of Systems      All pertinent POSITIVES as noted per HPI.  All other systems have been reviewed and are NEGATIVE and /or Noncontributory to this patient current visit or complaint.    Objective   Vitals:    07/16/24 1341   BP: 92/66   Pulse: 85   Temp: 36.4 °C (97.5 °F)   SpO2: 96%      Body mass index is 35.96 kg/m².  Physical Exam  Vitals and nursing note reviewed.   Constitutional:       Appearance: Normal appearance.   HENT:      Head: Normocephalic and atraumatic.   Neck:      Vascular: No carotid bruit.   Cardiovascular:      Rate and Rhythm: Normal rate and regular rhythm.      Pulses: Normal pulses.      Heart sounds: Normal heart sounds.   Pulmonary:      Effort: Pulmonary effort is normal.      Breath sounds: Normal breath sounds.   Abdominal:      General: Abdomen is flat. Bowel sounds are normal.      Palpations: Abdomen is soft.   Musculoskeletal:         General: No swelling. Normal range of motion.      Cervical back: Neck supple.   Lymphadenopathy:      Cervical: No cervical adenopathy.   Skin:     General: Skin is warm and dry.   Neurological:      Mental Status: She is alert.   Psychiatric:         Mood and Affect: Mood normal.       Diagnostic Results   Lab Results   Component Value Date    GLUCOSE 89 07/27/2023    CALCIUM 9.2 07/27/2023     07/27/2023    K 5.2 (H) 07/27/2023    CO2 29 07/27/2023      "07/27/2023    BUN 15 07/27/2023    CREATININE 0.8 07/27/2023     Lab Results   Component Value Date    ALT 8 07/27/2023    AST 17 07/27/2023    ALKPHOS 71 07/27/2023    BILITOT 0.3 07/27/2023     Lab Results   Component Value Date    WBC 8.5 07/27/2023    HGB 14.1 07/27/2023    HCT 43.4 07/27/2023    MCV 91.9 07/27/2023     07/27/2023     Lab Results   Component Value Date    CHOL 199 05/17/2023    CHOL 208 (H) 11/05/2021    CHOL 188 06/26/2020     Lab Results   Component Value Date    HDL 67 05/17/2023    HDL 63 11/05/2021    HDL 63 06/26/2020     Lab Results   Component Value Date    LDLCALC 116 05/17/2023    LDLCALC 131 (H) 11/05/2021    LDLCALC 107 06/26/2020     Lab Results   Component Value Date    TRIG 82 05/17/2023    TRIG 70 11/05/2021    TRIG 91 06/26/2020     No components found for: \"CHOLHDL\"  Lab Results   Component Value Date    HGBA1C 5.3 05/23/2022     Other labs not included in the list above were reviewed either before or during this encounter.    History    Past Medical History:   Diagnosis Date    Impacted cerumen of right ear 09/10/2023    Personal history of other diseases of the circulatory system     History of hypertension     Past Surgical History:   Procedure Laterality Date    GASTROSTOMY      gallbladder    OTHER SURGICAL HISTORY  04/19/2021    Oral surgery    OTHER SURGICAL HISTORY  04/19/2021    Knee surgery     Family History   Problem Relation Name Age of Onset    Hypertension Mother      Heart disease Father      Hypertension Father      No Known Problems Sister      No Known Problems Son      No Known Problems Son      Stroke Maternal Grandmother      Kidney failure Sibling      Pneumonia Sibling      Other (Liver Failure) Sibling       Social History     Socioeconomic History    Marital status:      Spouse name: Not on file    Number of children: Not on file    Years of education: Not on file    Highest education level: Not on file   Occupational History    Not on " file   Tobacco Use    Smoking status: Some Days     Types: Cigarettes     Passive exposure: Current    Smokeless tobacco: Never   Vaping Use    Vaping status: Never Used   Substance and Sexual Activity    Alcohol use: Yes    Drug use: Never    Sexual activity: Defer   Other Topics Concern    Not on file   Social History Narrative    Not on file     Social Determinants of Health     Financial Resource Strain: Not on file   Food Insecurity: Not on file   Transportation Needs: Not on file   Physical Activity: Not on file   Stress: Not on file   Social Connections: Not on file   Intimate Partner Violence: Not on file   Housing Stability: Not on file     Allergies   Allergen Reactions    Ciprofloxacin Anaphylaxis     Hives, rash, swelling    Erythromycin Other and Hives     Stomach upset, stabbing pains    Levofloxacin Anaphylaxis    Morphine Anaphylaxis, Other and Unknown     Severe anaphylaxis    VERY SENSITIVE to morphine; given 4mg and became unresponsive and hypotensive, but no hives to suggest allergy; responded to stimulus and IVF, did not require narcan    Moxifloxacin Anaphylaxis     Hives, respiratory distress    Penicillin Anaphylaxis     Hives, respiratory distress    Atorvastatin Myalgia    Iodinated Contrast Media Hives    Oxycodone-Acetaminophen Other     nightmares     Current Outpatient Medications on File Prior to Visit   Medication Sig Dispense Refill    acetaminophen (Tylenol) 325 mg tablet TAKE 2 TABLETS BY MOUTH EVERY 4 HOURS AS NEEDED FOR PAIN 30 tablet 0    calcium carbonate (Tums) 200 mg calcium chewable tablet Chew. prn      clindamycin (Cleocin) 300 mg capsule TAKE 2 CAPSULES BY MOUTH 1 HOUR PRIOR TO DENTAL APPOINTMENT AND 1 CAPSULE 1 HOUR POST VISIT      hydroCHLOROthiazide (Microzide) 12.5 mg tablet Take 1 tablet (12.5 mg) by mouth once daily. 90 tablet 2    losartan (Cozaar) 50 mg tablet Take 1 tablet (50 mg) by mouth once daily. 90 tablet 2    naproxen sodium (Aleve) 220 mg tablet Take 2  tablets (440 mg) by mouth every 12 hours. Prn      pravastatin (Pravachol) 40 mg tablet Take 1 tablet (40 mg) by mouth once daily. 90 tablet 2    sertraline (Zoloft) 50 mg tablet Take 1 tablet (50 mg) by mouth once daily. 90 tablet 2    ibuprofen 600 mg tablet TAKE 1 TABLET BY MOUTH EVERY 6 HOURS AS NEEDED FOR PAIN 40 tablet 0    [DISCONTINUED] acetaminophen (Tylenol) 500 mg tablet Take by mouth.      [DISCONTINUED] albuterol 90 mcg/actuation inhaler Inhale 2 puffs every 4 hours if needed.      [DISCONTINUED] azelastine (Astelin) 137 mcg (0.1 %) nasal spray Administer 1 spray into each nostril 2 times a day.      [DISCONTINUED] azithromycin (Zithromax) 250 mg tablet TK 1 T PO Q MON WED AND FRI      [DISCONTINUED] benzonatate (Tessalon) 100 mg capsule Take 1 capsule (100 mg) by mouth 3 times a day as needed.      [DISCONTINUED] budesonide-formoteroL (Symbicort) 160-4.5 mcg/actuation inhaler Inhale 2 puffs. rinse mouth after use      [DISCONTINUED] cetirizine HCl (ZYRTEC ORAL) Take by mouth.      [DISCONTINUED] cholecalciferol (Vitamin D-3) 25 MCG (1000 UT) capsule Take 1 capsule (25 mcg) by mouth once daily as needed.      [DISCONTINUED] esomeprazole (NexIUM 24HR) 20 mg DR capsule Take by mouth.      [DISCONTINUED] fluticasone (Flonase) 50 mcg/actuation nasal spray Administer 1 spray into each nostril once daily in the morning.      [DISCONTINUED] ketoconazole (NIZOral) 2 % cream Apply topically.      [DISCONTINUED] loratadine (Claritin) 10 mg tablet Take 1 half tablet by mouth once daily.      [DISCONTINUED] methylPREDNISolone (Medrol Dospak) 4 mg tablets Take by mouth.      [DISCONTINUED] montelukast (Singulair) 10 mg tablet Take 1 tablet (10 mg) by mouth.      [DISCONTINUED] omeprazole (PriLOSEC) 20 mg DR capsule Take 1 capsule (20 mg) by mouth once daily.      [DISCONTINUED] omeprazole OTC (PriLOSEC OTC) 20 mg EC tablet Take 1 tablet (20 mg) by mouth once daily.      [DISCONTINUED] predniSONE (Deltasone) 10 mg  tablet       [DISCONTINUED] zolpidem (Ambien) 10 mg tablet Take by mouth once daily as needed.       No current facility-administered medications on file prior to visit.     Immunization History   Administered Date(s) Administered    Flu vaccine, quadrivalent, high-dose, preservative free, age 65y+ (FLUZONE) 11/02/2021, 11/10/2022, 10/16/2023    Pfizer COVID-19 vaccine, Fall 2023, 12 years and older, (30mcg/0.3mL) 10/16/2023    Pfizer COVID-19 vaccine, bivalent, age 12 years and older (30 mcg/0.3 mL) 10/18/2022    Pfizer Purple Cap SARS-CoV-2 03/04/2021, 04/02/2021, 10/12/2021    Pneumococcal polysaccharide vaccine, 23-valent, age 2 years and older (PNEUMOVAX 23) 09/28/2017    Zoster, live 05/11/2010     Patient's medical history was reviewed and updated either before or during this encounter.       Gemma Negro MD

## 2024-08-06 ENCOUNTER — HOSPITAL ENCOUNTER (OUTPATIENT)
Dept: RADIOLOGY | Facility: CLINIC | Age: 79
Discharge: HOME | End: 2024-08-06
Payer: MEDICARE

## 2024-08-06 VITALS — WEIGHT: 187 LBS | BODY MASS INDEX: 33.13 KG/M2

## 2024-08-06 DIAGNOSIS — M81.0 AGE-RELATED OSTEOPOROSIS WITHOUT CURRENT PATHOLOGICAL FRACTURE: ICD-10-CM

## 2024-08-06 DIAGNOSIS — Z13.820 ENCOUNTER FOR SCREENING FOR OSTEOPOROSIS: ICD-10-CM

## 2024-08-06 DIAGNOSIS — Z12.31 ENCOUNTER FOR SCREENING MAMMOGRAM FOR BREAST CANCER: ICD-10-CM

## 2024-08-06 PROCEDURE — 77080 DXA BONE DENSITY AXIAL: CPT | Performed by: RADIOLOGY

## 2024-08-06 PROCEDURE — 77063 BREAST TOMOSYNTHESIS BI: CPT | Performed by: RADIOLOGY

## 2024-08-06 PROCEDURE — 77067 SCR MAMMO BI INCL CAD: CPT

## 2024-08-06 PROCEDURE — 77067 SCR MAMMO BI INCL CAD: CPT | Performed by: RADIOLOGY

## 2024-08-06 PROCEDURE — 77080 DXA BONE DENSITY AXIAL: CPT

## 2024-08-07 ENCOUNTER — OFFICE VISIT (OUTPATIENT)
Dept: OPHTHALMOLOGY | Facility: CLINIC | Age: 79
End: 2024-08-07
Payer: MEDICARE

## 2024-08-07 DIAGNOSIS — H52.7 REFRACTIVE ERROR: ICD-10-CM

## 2024-08-07 DIAGNOSIS — H25.813 COMBINED FORMS OF AGE-RELATED CATARACT OF BOTH EYES: Primary | ICD-10-CM

## 2024-08-07 DIAGNOSIS — H35.371 EPIRETINAL MEMBRANE (ERM) OF RIGHT EYE: ICD-10-CM

## 2024-08-07 DIAGNOSIS — H18.823 DISORDER OF BOTH CORNEAS DUE TO CONTACT LENS: ICD-10-CM

## 2024-08-07 PROCEDURE — 92015 DETERMINE REFRACTIVE STATE: CPT | Performed by: OPHTHALMOLOGY

## 2024-08-07 PROCEDURE — 92325 MODIFICATION OF CONTACT LENS: CPT | Performed by: OPHTHALMOLOGY

## 2024-08-07 PROCEDURE — 99214 OFFICE O/P EST MOD 30 MIN: CPT | Performed by: OPHTHALMOLOGY

## 2024-08-07 ASSESSMENT — ENCOUNTER SYMPTOMS
MUSCULOSKELETAL NEGATIVE: 0
CONSTITUTIONAL NEGATIVE: 0
HEMATOLOGIC/LYMPHATIC NEGATIVE: 0
EYES NEGATIVE: 0
PSYCHIATRIC NEGATIVE: 0
GASTROINTESTINAL NEGATIVE: 0
ENDOCRINE NEGATIVE: 0
CARDIOVASCULAR NEGATIVE: 0
RESPIRATORY NEGATIVE: 0
ALLERGIC/IMMUNOLOGIC NEGATIVE: 0
NEUROLOGICAL NEGATIVE: 0

## 2024-08-07 ASSESSMENT — SLIT LAMP EXAM - LIDS
COMMENTS: NORMAL
COMMENTS: NORMAL

## 2024-08-07 ASSESSMENT — REFRACTION_CURRENTRX
OD_AXIS: 090
OS_DIAMETER: 14.5
OD_CYLINDER: -1.25
OD_SPHERE: -0.50
OD_DIAMETER: 14.5
OD_BRAND: BIOFINITY TORIC
OS_BRAND: BIOFINITY TORIC
OD_BASECURVE: 8.7
OS_BASECURVE: 8.7
OS_AXIS: 090
OS_SPHERE: -1.00
OS_CYLINDER: -1.25

## 2024-08-07 ASSESSMENT — KERATOMETRY
OS_AXISANGLE2_DEGREES: 95
OD_K1POWER_DIOPTERS: 42.75
OS_K2POWER_DIOPTERS: 42.75
OS_AXISANGLE_DEGREES: 5
OD_K2POWER_DIOPTERS: 42.75
OS_K1POWER_DIOPTERS: 41.75
OD_AXISANGLE_DEGREES: 90
OD_AXISANGLE2_DEGREES: 180

## 2024-08-07 ASSESSMENT — PAIN SCALES - GENERAL: PAINLEVEL: 0-NO PAIN

## 2024-08-07 ASSESSMENT — EXTERNAL EXAM - RIGHT EYE: OD_EXAM: NORMAL

## 2024-08-07 ASSESSMENT — CUP TO DISC RATIO
OD_RATIO: 0.3
OS_RATIO: 0.3

## 2024-08-07 ASSESSMENT — VISUAL ACUITY
CORRECTION_TYPE: GLASSES
OS_CC: 20/25
OD_CC+: -1
OS_CC+: -1
METHOD: SNELLEN - LINEAR
OD_CC: 20/25

## 2024-08-07 ASSESSMENT — PATIENT HEALTH QUESTIONNAIRE - PHQ9
1. LITTLE INTEREST OR PLEASURE IN DOING THINGS: NOT AT ALL
2. FEELING DOWN, DEPRESSED OR HOPELESS: NOT AT ALL
SUM OF ALL RESPONSES TO PHQ9 QUESTIONS 1 AND 2: 0

## 2024-08-07 ASSESSMENT — REFRACTION_WEARINGRX
OD_SPHERE: -0.75
OD_AXIS: 085
OS_CYLINDER: -1.25
OS_AXIS: 090
SPECS_TYPE: DISTANCE
OS_SPHERE: -1.50
OD_CYLINDER: -1.25

## 2024-08-07 ASSESSMENT — TONOMETRY
IOP_METHOD: GOLDMANN APPLANATION
OD_IOP_MMHG: 14
OS_IOP_MMHG: 14

## 2024-08-07 ASSESSMENT — EXTERNAL EXAM - LEFT EYE: OS_EXAM: NORMAL

## 2024-08-07 NOTE — ASSESSMENT & PLAN NOTE
Mild wear and tear, no contraindication to continue wearing. Will order in trials to provide to see if can get sharper quality of vision.

## 2024-08-07 NOTE — PROGRESS NOTES
Assessment/Plan   Problem List Items Addressed This Visit       Age-related cataract of both eyes - Primary     Non significant cataract noted on exam. Will plan to continue to monitor with serial exam.            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 Will order in trials to provide to see if can get sharper quality of vision.         Epiretinal membrane (ERM) of right eye     Stable and non significant, will monitor with serial exam.          Refractive error     Discussed glasses prescription from refraction. Will provide if patient interested in keeping for records or to fill as a new set of glasses. Holding on soft contact lens (SCL) RX until after trial period, ordering lenses             Provided reassurance regarding above diagnoses and care received in the office visit today. Discussed outcomes and options along with the importance of treatment compliance. Understands the importance of any follow up visits. Patient instructed to call/communicate with our office if any new issues, questions, or concerns.     Will plan to see back in 1 year full SCL or sooner PRN

## 2024-08-07 NOTE — ASSESSMENT & PLAN NOTE
Discussed glasses prescription from [facility]. Will provide if patient interested in keeping for records or to fill as a new set of glasses. Holding on soft contact lens (SCL) RX until after trial period, ordering lenses

## 2024-08-07 NOTE — PATIENT INSTRUCTIONS
Thank you so much for choosing me to provide your care today!    If you were dilated your vision may remain blurry   or light sensitive for several hours.    The nature of eye and vision problems can require frequent follow up, please make every effort to adhere to any future appointments.    If you have any issues, questions, or concerns,   please do not hesitate to reach out.    If you receive a survey in regards to your care today, please mention any exceptional care my office staff and/or technicians provided.    You can reach our office at this number:  [phone]

## 2024-08-13 ENCOUNTER — APPOINTMENT (OUTPATIENT)
Dept: ORTHOPEDIC SURGERY | Facility: CLINIC | Age: 79
End: 2024-08-13
Payer: MEDICARE

## 2024-08-20 ENCOUNTER — OFFICE VISIT (OUTPATIENT)
Dept: ORTHOPEDIC SURGERY | Facility: CLINIC | Age: 79
End: 2024-08-20
Payer: MEDICARE

## 2024-08-20 DIAGNOSIS — M19.012 PRIMARY OSTEOARTHRITIS OF LEFT SHOULDER: Primary | ICD-10-CM

## 2024-08-20 PROCEDURE — 1159F MED LIST DOCD IN RCRD: CPT | Performed by: ORTHOPAEDIC SURGERY

## 2024-08-20 PROCEDURE — 1160F RVW MEDS BY RX/DR IN RCRD: CPT | Performed by: ORTHOPAEDIC SURGERY

## 2024-08-20 PROCEDURE — 2500000004 HC RX 250 GENERAL PHARMACY W/ HCPCS (ALT 636 FOR OP/ED): Performed by: ORTHOPAEDIC SURGERY

## 2024-08-20 PROCEDURE — 4004F PT TOBACCO SCREEN RCVD TLK: CPT | Performed by: ORTHOPAEDIC SURGERY

## 2024-08-20 PROCEDURE — 1125F AMNT PAIN NOTED PAIN PRSNT: CPT | Performed by: ORTHOPAEDIC SURGERY

## 2024-08-20 PROCEDURE — 2500000005 HC RX 250 GENERAL PHARMACY W/O HCPCS: Performed by: ORTHOPAEDIC SURGERY

## 2024-08-20 PROCEDURE — 99213 OFFICE O/P EST LOW 20 MIN: CPT | Performed by: ORTHOPAEDIC SURGERY

## 2024-08-20 PROCEDURE — 20611 DRAIN/INJ JOINT/BURSA W/US: CPT | Mod: LT | Performed by: ORTHOPAEDIC SURGERY

## 2024-08-20 ASSESSMENT — PAIN SCALES - GENERAL: PAINLEVEL_OUTOF10: 7

## 2024-08-20 ASSESSMENT — PAIN - FUNCTIONAL ASSESSMENT: PAIN_FUNCTIONAL_ASSESSMENT: 0-10

## 2024-08-21 ENCOUNTER — CLINICAL SUPPORT (OUTPATIENT)
Dept: OPHTHALMOLOGY | Facility: CLINIC | Age: 79
End: 2024-08-21
Payer: MEDICARE

## 2024-08-21 RX ORDER — LIDOCAINE HYDROCHLORIDE 10 MG/ML
3 INJECTION INFILTRATION; PERINEURAL
Status: COMPLETED | OUTPATIENT
Start: 2024-08-20 | End: 2024-08-20

## 2024-08-21 ASSESSMENT — REFRACTION_CURRENTRX
OS_BRAND: BIOFINITY TORIC
OD_SPHERE: -0.50
OD_CYLINDER: -1.25
OS_DIAMETER: 14.5
OS_BASECURVE: 8.7
OS_AXIS: 090
OD_SPHERE: -0.75
OS_BRAND: BIOFINITY TORIC
OD_BRAND: BIOFINITY TORIC
OD_AXIS: 090
OD_DIAMETER: 14.5
OS_CYLINDER: -1.25
OD_AXIS: 090
OS_SPHERE: -0.75
OS_CYLINDER: -1.25
OD_BASECURVE: 8.7
OD_DIAMETER: 14.5
OS_SPHERE: -1.00
OD_CYLINDER: -1.25
OD_BASECURVE: 8.7
OD_BRAND: BIOFINITY TORIC
OS_BASECURVE: 8.7
OS_AXIS: 090
OS_DIAMETER: 14.5

## 2024-08-21 ASSESSMENT — ENCOUNTER SYMPTOMS
WHEEZING: 0
RHINORRHEA: 0
CHILLS: 0
JOINT SWELLING: 0
EYE DISCHARGE: 0
SHORTNESS OF BREATH: 0
TROUBLE SWALLOWING: 0
FEVER: 0
FACIAL SWELLING: 0
ARTHRALGIAS: 1
COLOR CHANGE: 0

## 2024-08-21 NOTE — PROGRESS NOTES
Reason for Appointment  Chief Complaint   Patient presents with    Left Shoulder - Pain     History of Present Illness  Patient is a 79 y.o. female here today for follow-up evaluation of recurrent left shoulder pain.  Subacromial injection back in May did give her better relief than shoulder joint injections.  Previous x-rays have shown severe glenohumeral joint arthritis.  She is not interested in any surgery, she would like a repeat injection today.  She has pain with any overhead activity and sleeping at night.  No other changes in her past medical history, allergies, medications    Past Medical History:   Diagnosis Date    Combined forms of age-related cataract, bilateral     Corneal disorder due to contact lens, bilateral     Dry eyes     Impacted cerumen of right ear 09/10/2023    Personal history of other diseases of the circulatory system     History of hypertension    Puckering of macula, right eye     Refractive error     Vitreous degeneration, bilateral        Past Surgical History:   Procedure Laterality Date    BREAST BIOPSY      GASTROSTOMY      gallbladder    OTHER SURGICAL HISTORY  04/19/2021    Oral surgery    OTHER SURGICAL HISTORY  04/19/2021    Knee surgery       Medication Documentation Review Audit       Reviewed by Maria L Wright PA-C (Physician Assistant) on 08/21/24 at 0731      Medication Order Taking? Sig Documenting Provider Last Dose Status   acetaminophen (Tylenol) 325 mg tablet [id number] Yes TAKE 2 TABLETS BY MOUTH EVERY 4 HOURS AS NEEDED FOR PAIN Owen Sierra MD Taking Active   clindamycin (Cleocin) 300 mg capsule [id number] Yes TAKE 2 CAPSULES BY MOUTH 1 HOUR PRIOR TO DENTAL APPOINTMENT AND 1 CAPSULE 1 HOUR POST VISIT Historical Provider, MD Taking Active   hydroCHLOROthiazide (Microzide) 12.5 mg tablet [id number] Yes Take 1 tablet (12.5 mg) by mouth once daily. Gemma Negro MD Taking Active   losartan (Cozaar) 50 mg tablet [id number] Yes Take 1 tablet (50 mg) by mouth once  daily. Gemma Negro MD Taking Active   naproxen sodium (Aleve) 220 mg tablet [id number] Yes Take 2 tablets (440 mg) by mouth every 12 hours. Prn Historical Provider, MD Taking Active   pravastatin (Pravachol) 40 mg tablet [id number] Yes Take 1 tablet (40 mg) by mouth once daily. Gemma Negro MD Taking Active   sertraline (Zoloft) 50 mg tablet [id number] Yes Take 1 tablet (50 mg) by mouth once daily. Gemma Negro MD Taking Active                    Allergies   Allergen Reactions    Ciprofloxacin Anaphylaxis     Hives, rash, swelling    Erythromycin Other and Hives     Stomach upset, stabbing pains    Levofloxacin Anaphylaxis    Morphine Anaphylaxis, Other and Unknown     Severe anaphylaxis    VERY SENSITIVE to morphine; given 4mg and became unresponsive and hypotensive, but no hives to suggest allergy; responded to stimulus and IVF, did not require narcan    Moxifloxacin Anaphylaxis     Hives, respiratory distress    Penicillin Anaphylaxis     Hives, respiratory distress    Atorvastatin Myalgia    Iodinated Contrast Media Hives    Oxycodone-Acetaminophen Other     nightmares       Review of Systems   Constitutional:  Negative for chills and fever.   HENT:  Negative for facial swelling, rhinorrhea and trouble swallowing.    Eyes:  Negative for discharge.   Respiratory:  Negative for shortness of breath and wheezing.    Cardiovascular:  Negative for chest pain.   Musculoskeletal:  Positive for arthralgias. Negative for joint swelling.   Skin:  Negative for color change and pallor.   All other systems reviewed and are negative.    Exam   On exam the left shoulder shows just over 100 degrees of active forward flexion, she has crepitation with movement of the shoulder.  She has positive impingement signs on the left and weakness with external rotation.  Deltoid is functional.  Good pulses and sensation in the upper extremity    Assessment   Left shoulder osteoarthritis    Plan   We sterilely injected under  ultrasound guidance Kenalog lidocaine into the left shoulder subacromial space.  Patient understands the small risk of infection and the signs to look for as well as flare reaction and the risks of repeated injections with further deterioration of the rotator cuff.  She can follow-up with us as needed.    L Inj/Asp: L subacromial bursa on 8/20/2024 1:53 PM  Indications: pain  Details: 22 G needle, ultrasound-guided  Medications: 3 mL lidocaine 10 mg/mL (1 %); 30 mg triamcinolone acetonide 10 mg/mL  Outcome: tolerated well, no immediate complications    After discussing the risks and benefits of the procedure with proceeded with an injection.  Using ultrasound guidance we identified the acromion, humeral head and the subacromial bursa, images obtained. We then sterilely injected the left subacrominal space with a mixture of 30 mg of Kenalog and 2 cc of 1 % lidocaine. Pt tolerated the procedure well without any adverse reactions.   Procedure, treatment alternatives, risks and benefits explained, specific risks discussed. Consent was given by the patient. Immediately prior to procedure a time out was called to verify the correct patient, procedure, equipment, support staff and site/side marked as required. Patient was prepped and draped in the usual sterile fashion.       Written by Maria L Phoenix saw, evaluated, and treated the patient with the PA

## 2024-11-05 ENCOUNTER — OFFICE VISIT (OUTPATIENT)
Dept: ORTHOPEDIC SURGERY | Facility: CLINIC | Age: 79
End: 2024-11-05
Payer: MEDICARE

## 2024-11-05 DIAGNOSIS — M19.012 PRIMARY OSTEOARTHRITIS OF LEFT SHOULDER: Primary | ICD-10-CM

## 2024-11-05 PROCEDURE — 4004F PT TOBACCO SCREEN RCVD TLK: CPT | Performed by: ORTHOPAEDIC SURGERY

## 2024-11-05 PROCEDURE — 1159F MED LIST DOCD IN RCRD: CPT | Performed by: ORTHOPAEDIC SURGERY

## 2024-11-05 PROCEDURE — 20611 DRAIN/INJ JOINT/BURSA W/US: CPT | Mod: LT | Performed by: ORTHOPAEDIC SURGERY

## 2024-11-05 PROCEDURE — 1160F RVW MEDS BY RX/DR IN RCRD: CPT | Performed by: ORTHOPAEDIC SURGERY

## 2024-11-05 PROCEDURE — 99213 OFFICE O/P EST LOW 20 MIN: CPT | Performed by: ORTHOPAEDIC SURGERY

## 2024-11-05 PROCEDURE — 1125F AMNT PAIN NOTED PAIN PRSNT: CPT | Performed by: ORTHOPAEDIC SURGERY

## 2024-11-05 PROCEDURE — 2500000004 HC RX 250 GENERAL PHARMACY W/ HCPCS (ALT 636 FOR OP/ED): Performed by: ORTHOPAEDIC SURGERY

## 2024-11-05 RX ORDER — TRIAMCINOLONE ACETONIDE 40 MG/ML
40 INJECTION, SUSPENSION INTRA-ARTICULAR; INTRAMUSCULAR
Status: COMPLETED | OUTPATIENT
Start: 2024-11-05 | End: 2024-11-05

## 2024-11-05 RX ORDER — LIDOCAINE HYDROCHLORIDE 10 MG/ML
3 INJECTION, SOLUTION INFILTRATION; PERINEURAL
Status: COMPLETED | OUTPATIENT
Start: 2024-11-05 | End: 2024-11-05

## 2024-11-05 ASSESSMENT — ENCOUNTER SYMPTOMS
WHEEZING: 0
SHORTNESS OF BREATH: 0
FEVER: 0
FATIGUE: 0
ARTHRALGIAS: 1
BRUISES/BLEEDS EASILY: 0
CHILLS: 0

## 2024-11-05 ASSESSMENT — PAIN SCALES - GENERAL: PAINLEVEL_OUTOF10: 8

## 2024-11-05 ASSESSMENT — PAIN - FUNCTIONAL ASSESSMENT: PAIN_FUNCTIONAL_ASSESSMENT: 0-10

## 2024-11-05 NOTE — PROGRESS NOTES
Reason for Appointment  Chief Complaint   Patient presents with    Left Shoulder - Pain     History of Present Illness  Patient is a 79 y.o. female here today for follow-up evaluation of left shoulder pain 3 months s/p left subacromial injection. We last saw her on 8/20/24 and we gave her a left subacromial injection. Today she reports she hit tip of the left elbow and felt significant pain and she does have a small amount of bursitis on the tip of her left elbow. She reports her last injection took about a week to kick in but it gave her good relief and she request a repeat injection today. Her left shoulder pain is back today. Pain with overhead lifting. No recent falls or injuries. No other changes in past medical history, allergies, or medications.      Past Medical History:   Diagnosis Date    Combined forms of age-related cataract, bilateral     Corneal disorder due to contact lens, bilateral     Dry eyes     Impacted cerumen of right ear 09/10/2023    Personal history of other diseases of the circulatory system     History of hypertension    Puckering of macula, right eye     Refractive error     Vitreous degeneration, bilateral        Past Surgical History:   Procedure Laterality Date    BREAST BIOPSY      GASTROSTOMY      gallbladder    OTHER SURGICAL HISTORY  04/19/2021    Oral surgery    OTHER SURGICAL HISTORY  04/19/2021    Knee surgery       Medication Documentation Review Audit       Reviewed by Shelley Newman MA (Medical Assistant) on 11/05/24 at 0917      Medication Order Taking? Sig Documenting Provider Last Dose Status   clindamycin (Cleocin) 300 mg capsule [id number] Yes TAKE 2 CAPSULES BY MOUTH 1 HOUR PRIOR TO DENTAL APPOINTMENT AND 1 CAPSULE 1 HOUR POST VISIT Historical Provider, MD Taking Active   hydroCHLOROthiazide (Microzide) 12.5 mg tablet [id number] Yes Take 1 tablet (12.5 mg) by mouth once daily. Gemma Negro MD Taking Active   losartan (Cozaar) 50 mg tablet [id number] Yes Take 1  tablet (50 mg) by mouth once daily. Gemma Negro MD Taking Active   naproxen sodium (Aleve) 220 mg tablet [id number] Yes Take 2 tablets (440 mg) by mouth every 12 hours. Prn Historical Provider, MD Taking Active   pravastatin (Pravachol) 40 mg tablet [id number] Yes Take 1 tablet (40 mg) by mouth once daily. Gemma Ngero MD Taking Active   sertraline (Zoloft) 50 mg tablet [id number] Yes Take 1 tablet (50 mg) by mouth once daily. Gemma Negro MD Taking Active                    Allergies   Allergen Reactions    Ciprofloxacin Anaphylaxis     Hives, rash, swelling    Erythromycin Other and Hives     Stomach upset, stabbing pains    Levofloxacin Anaphylaxis    Morphine Anaphylaxis, Other and Unknown     Severe anaphylaxis    VERY SENSITIVE to morphine; given 4mg and became unresponsive and hypotensive, but no hives to suggest allergy; responded to stimulus and IVF, did not require narcan    Moxifloxacin Anaphylaxis     Hives, respiratory distress    Penicillin Anaphylaxis     Hives, respiratory distress    Atorvastatin Myalgia    Iodinated Contrast Media Hives    Oxycodone-Acetaminophen Other     nightmares       Review of Systems   Constitutional:  Negative for chills, fatigue and fever.   Respiratory:  Negative for shortness of breath and wheezing.    Cardiovascular:  Negative for chest pain and leg swelling.   Musculoskeletal:  Positive for arthralgias.   Allergic/Immunologic: Negative for immunocompromised state.   Hematological:  Does not bruise/bleed easily.       Exam   Pt is alert awake, orientated to person place and time. No acute distress. Mood is good. Good pulses and good sensation. Good motion of the arm. Raise the arm over 130 degrees. Mild weakness in external rotation. She does have gross crepitation.     Assessment   Left shoulder osteoarthritis     Plan   We again discussed a reverse shoulder replacement. We dicussed doing repeat injections at least 3 months apart, but 6 months is  better.      Patient ID: Sue Gar is a 79 y.o. female.    L Inj/Asp: L subacromial bursa on 11/5/2024 9:34 AM  Indications: pain  Details: 22 G needle, ultrasound-guided  Medications: 40 mg triamcinolone acetonide 40 mg/mL; 3 mL lidocaine 10 mg/mL (1 %)  Outcome: tolerated well, no immediate complications    After discussing the risks and benefits of the procedure with proceeded with an injection.  Using ultrasound guidance we identified the acromion, humeral head and the subacromial bursa, images obtained. We then sterilely injected the left subacrominal space with a mixture of 40 mg of Kenalog and 1 cc of 1 % lidocaine. Pt tolerated the procedure well without any adverse reactions.    Procedure, treatment alternatives, risks and benefits explained, specific risks discussed. Consent was given by the patient. Immediately prior to procedure a time out was called to verify the correct patient, procedure, equipment, support staff and site/side marked as required. Patient was prepped and draped in the usual sterile fashion.           I, Daniela Juarez, attest that this documentation has been prepared under the direction and in the presence of Manuel Phoenix MD.   By signing below, IManuel MD, personally performed the services described in this documentation. All medical record entries made by the scribe were at my direction and in my presence. I have reviewed the chart and agree that the record reflects my personal performance and is accurate and complete.

## 2024-12-20 ENCOUNTER — OFFICE VISIT (OUTPATIENT)
Dept: PRIMARY CARE | Facility: CLINIC | Age: 79
End: 2024-12-20
Payer: MEDICARE

## 2024-12-20 ENCOUNTER — TELEPHONE (OUTPATIENT)
Dept: PRIMARY CARE | Facility: CLINIC | Age: 79
End: 2024-12-20
Payer: MEDICARE

## 2024-12-20 VITALS
WEIGHT: 187 LBS | DIASTOLIC BLOOD PRESSURE: 66 MMHG | SYSTOLIC BLOOD PRESSURE: 118 MMHG | TEMPERATURE: 97.1 F | OXYGEN SATURATION: 96 % | BODY MASS INDEX: 33.13 KG/M2 | HEIGHT: 63 IN | HEART RATE: 72 BPM

## 2024-12-20 DIAGNOSIS — R05.1 ACUTE COUGH: ICD-10-CM

## 2024-12-20 DIAGNOSIS — J45.41 MODERATE PERSISTENT ASTHMATIC BRONCHITIS WITH ACUTE EXACERBATION (HHS-HCC): Primary | ICD-10-CM

## 2024-12-20 PROCEDURE — 99213 OFFICE O/P EST LOW 20 MIN: CPT | Performed by: INTERNAL MEDICINE

## 2024-12-20 PROCEDURE — 3078F DIAST BP <80 MM HG: CPT | Performed by: INTERNAL MEDICINE

## 2024-12-20 PROCEDURE — 3074F SYST BP LT 130 MM HG: CPT | Performed by: INTERNAL MEDICINE

## 2024-12-20 PROCEDURE — 1159F MED LIST DOCD IN RCRD: CPT | Performed by: INTERNAL MEDICINE

## 2024-12-20 PROCEDURE — 1126F AMNT PAIN NOTED NONE PRSNT: CPT | Performed by: INTERNAL MEDICINE

## 2024-12-20 RX ORDER — METHYLPREDNISOLONE 4 MG/1
TABLET ORAL
Qty: 21 TABLET | Refills: 0 | Status: SHIPPED | OUTPATIENT
Start: 2024-12-20 | End: 2024-12-26

## 2024-12-20 RX ORDER — GUAIFENESIN AND DEXTROMETHORPHAN HYDROBROMIDE 600; 30 MG/1; MG/1
1 TABLET, EXTENDED RELEASE ORAL EVERY 12 HOURS
Qty: 20 TABLET | Refills: 0 | Status: SHIPPED | OUTPATIENT
Start: 2024-12-20 | End: 2024-12-30

## 2024-12-20 RX ORDER — DOXYCYCLINE 100 MG/1
100 CAPSULE ORAL 2 TIMES DAILY
Qty: 14 CAPSULE | Refills: 0 | Status: SHIPPED | OUTPATIENT
Start: 2024-12-20 | End: 2024-12-27

## 2024-12-20 ASSESSMENT — ENCOUNTER SYMPTOMS
RHINORRHEA: 0
SHORTNESS OF BREATH: 0
SORE THROAT: 0
CHILLS: 0
SWOLLEN GLANDS: 0
HEADACHES: 0
WHEEZING: 1
SPUTUM PRODUCTION: 0
COUGH: 1
FEVER: 0

## 2024-12-20 ASSESSMENT — PAIN SCALES - GENERAL: PAINLEVEL_OUTOF10: 0-NO PAIN

## 2024-12-20 NOTE — TELEPHONE ENCOUNTER
Pt c/o wheezing, cough with yellow mucus, runny nose.  COVID test is negative.  Pt suspects bronchitis.  Pt requesting appt or Rx as family coming into town for holidays.  Please advise.  Ph: [phone]

## 2024-12-20 NOTE — PROGRESS NOTES
[facility]: [facility]  PROGRESS NOTE      Sue Gar is a 79 y.o. female that is presenting today for Cough (Pt. Says she has bronchitis for a little over a week, neg. For covid.).    Assessment/Plan   Diagnoses and all orders for this visit:  Moderate persistent asthmatic bronchitis with acute exacerbation ([facility])  -     doxycycline (Vibramycin) 100 mg capsule; Take 1 capsule (100 mg) by mouth 2 times a day for 7 days. Take with at least 8 ounces (large glass) of water, do not lie down for 30 minutes after  -     methylPREDNISolone (Medrol Dospak) 4 mg tablets; Take as directed on package.  Acute cough  -     dextromethorphan-guaifenesin (Mucinex DM)  mg 12 hr tablet; Take 1 tablet by mouth every 12 hours for 10 days. Do not crush, chew, or split.  Subjective     - Sue Gar 79 y.o. female is here today for sick visit Coughing and wheezing x few days her grandsons were sick and she thinks she picked up something from them.  Wheezing   This is a new problem. The current episode started in the past 7 days. The problem occurs constantly. The problem has been gradually worsening. Associated symptoms include coughing. Pertinent negatives include no chest pain, chills, coryza, ear pain, fever, headaches, rhinorrhea, shortness of breath, sore throat, sputum production or swollen glands. Nothing aggravates the symptoms. She has tried OTC cough suppressant for the symptoms. The treatment provided no relief.          - Patient denies any other symptoms or concerns at this time.       - patient denies any adverse reactions to or concerns with his/her meds.       - Problem list and medication reconciliation done today.  - V.S. Stable. No changes at this time.  - Encouraged continued diet and exercise modification.     Review of Systems   Constitutional:  Negative for chills and fever.   HENT:  Negative for ear pain, rhinorrhea and sore throat.    Respiratory:  Positive for cough and  wheezing. Negative for sputum production and shortness of breath.    Cardiovascular:  Negative for chest pain.   Neurological:  Negative for headaches.      All pertinent POSITIVES as noted per HPI.  All other systems have been reviewed and are NEGATIVE and /or Noncontributory to this patient current visit or complaint.     Objective   Vitals:    12/20/24 1323   BP: 118/66   Pulse: 72   Temp: 36.2 °C (97.1 °F)   SpO2: 96%      Body mass index is 33.13 kg/m².  Physical Exam  Vitals and nursing note reviewed.   Constitutional:       Appearance: Normal appearance.   HENT:      Head: Normocephalic and atraumatic.      Right Ear: Tympanic membrane, ear canal and external ear normal.      Left Ear: Tympanic membrane, ear canal and external ear normal.      Nose: Congestion present. No rhinorrhea.      Mouth/Throat:      Mouth: Mucous membranes are moist.      Pharynx: Oropharynx is clear.   Neck:      Vascular: No carotid bruit.   Cardiovascular:      Rate and Rhythm: Normal rate and regular rhythm.      Pulses: Normal pulses.      Heart sounds: Normal heart sounds.   Pulmonary:      Effort: Pulmonary effort is normal.      Breath sounds: Wheezing and rhonchi present.   Abdominal:      General: Abdomen is flat. Bowel sounds are normal.      Palpations: Abdomen is soft.      Tenderness: There is no abdominal tenderness.   Musculoskeletal:         General: No swelling. Normal range of motion.      Cervical back: Neck supple.   Lymphadenopathy:      Cervical: No cervical adenopathy.   Skin:     General: Skin is warm and dry.   Neurological:      Mental Status: She is alert.   Psychiatric:         Mood and Affect: Mood normal.       Diagnostic Results   Lab Results   Component Value Date    GLUCOSE 89 07/27/2023    CALCIUM 9.2 07/27/2023     07/27/2023    K 5.2 (H) 07/27/2023    CO2 29 07/27/2023     07/27/2023    BUN 15 07/27/2023    CREATININE 0.8 07/27/2023     Lab Results   Component Value Date    ALT 8  "07/27/2023    AST 17 07/27/2023    ALKPHOS 71 07/27/2023    BILITOT 0.3 07/27/2023     Lab Results   Component Value Date    WBC 8.5 07/27/2023    HGB 14.1 07/27/2023    HCT 43.4 07/27/2023    MCV 91.9 07/27/2023     07/27/2023     Lab Results   Component Value Date    CHOL 199 05/17/2023    CHOL 208 (H) 11/05/2021    CHOL 188 06/26/2020     Lab Results   Component Value Date    HDL 67 05/17/2023    HDL 63 11/05/2021    HDL 63 06/26/2020     Lab Results   Component Value Date    LDLCALC 116 05/17/2023    LDLCALC 131 (H) 11/05/2021    LDLCALC 107 06/26/2020     Lab Results   Component Value Date    TRIG 82 05/17/2023    TRIG 70 11/05/2021    TRIG 91 06/26/2020     No components found for: \"CHOLHDL\"  Lab Results   Component Value Date    HGBA1C 5.3 05/23/2022     Other labs not included in the list above were reviewed either before or during this encounter.    History    Past Medical History:   Diagnosis Date    Combined forms of age-related cataract, bilateral     Corneal disorder due to contact lens, bilateral     Dry eyes     Impacted cerumen of right ear 09/10/2023    Personal history of other diseases of the circulatory system     History of hypertension    Puckering of macula, right eye     Refractive error     Vitreous degeneration, bilateral      Past Surgical History:   Procedure Laterality Date    BREAST BIOPSY      GASTROSTOMY      gallbladder    OTHER SURGICAL HISTORY  04/19/2021    Oral surgery    OTHER SURGICAL HISTORY  04/19/2021    Knee surgery     Family History   Problem Relation Name Age of Onset    Hypertension Mother      Heart disease Father      Hypertension Father      Breast cancer Sister      No Known Problems Son      No Known Problems Son      Stroke Maternal Grandmother      Kidney failure Sibling      Pneumonia Sibling      Other (Liver Failure) Sibling       Social History     Socioeconomic History    Marital status:      Spouse name: Not on file    Number of children: Not " on file    Years of education: Not on file    Highest education level: Not on file   Occupational History    Not on file   Tobacco Use    Smoking status: Some Days     Types: Cigarettes     Passive exposure: Current    Smokeless tobacco: Never   Vaping Use    Vaping status: Never Used   Substance and Sexual Activity    Alcohol use: Yes     Comment: occ    Drug use: Never    Sexual activity: Defer   Other Topics Concern    Not on file   Social History Narrative    Not on file     Social Drivers of Health     Financial Resource Strain: Not on file   Food Insecurity: Not on file   Transportation Needs: Not on file   Physical Activity: Not on file   Stress: Not on file   Social Connections: Not on file   Intimate Partner Violence: Not on file   Housing Stability: Not on file     Allergies   Allergen Reactions    Ciprofloxacin Anaphylaxis     Hives, rash, swelling    Erythromycin Other and Hives     Stomach upset, stabbing pains    Levofloxacin Anaphylaxis    Morphine Anaphylaxis, Other and Unknown     Severe anaphylaxis    VERY SENSITIVE to morphine; given 4mg and became unresponsive and hypotensive, but no hives to suggest allergy; responded to stimulus and IVF, did not require narcan    Moxifloxacin Anaphylaxis     Hives, respiratory distress    Penicillin Anaphylaxis     Hives, respiratory distress    Atorvastatin Myalgia    Iodinated Contrast Media Hives    Oxycodone-Acetaminophen Other     nightmares     Current Outpatient Medications on File Prior to Visit   Medication Sig Dispense Refill    hydroCHLOROthiazide (Microzide) 12.5 mg tablet Take 1 tablet (12.5 mg) by mouth once daily. 100 tablet 1    losartan (Cozaar) 50 mg tablet Take 1 tablet (50 mg) by mouth once daily. 100 tablet 1    naproxen sodium (Aleve) 220 mg tablet Take 2 tablets (440 mg) by mouth every 12 hours. Prn      pravastatin (Pravachol) 40 mg tablet Take 1 tablet (40 mg) by mouth once daily. 100 tablet 1    sertraline (Zoloft) 50 mg tablet Take 1  tablet (50 mg) by mouth once daily. 100 tablet 1    [DISCONTINUED] clindamycin (Cleocin) 300 mg capsule TAKE 2 CAPSULES BY MOUTH 1 HOUR PRIOR TO DENTAL APPOINTMENT AND 1 CAPSULE 1 HOUR POST VISIT       No current facility-administered medications on file prior to visit.     Immunization History   Administered Date(s) Administered    Flu vaccine, quadrivalent, high-dose, preservative free, age 65y+ (FLUZONE) 11/02/2021, 11/10/2022, 10/16/2023    Pfizer COVID-19 vaccine, 12 years and older, (30mcg/0.3mL) (Comirnaty) 10/16/2023, 10/22/2024    Pfizer COVID-19 vaccine, bivalent, age 12 years and older (30 mcg/0.3 mL) 10/18/2022    Pfizer Purple Cap SARS-CoV-2 03/04/2021, 04/02/2021, 10/12/2021    Pneumococcal polysaccharide vaccine, 23-valent, age 2 years and older (PNEUMOVAX 23) 09/28/2017    Zoster, live 05/11/2010     Patient's medical history was reviewed and updated either before or during this encounter.       Gemma Negro MD

## 2025-01-14 ENCOUNTER — LAB (OUTPATIENT)
Dept: LAB | Facility: LAB | Age: 80
End: 2025-01-14
Payer: MEDICARE

## 2025-01-14 ENCOUNTER — OFFICE VISIT (OUTPATIENT)
Dept: ORTHOPEDIC SURGERY | Facility: CLINIC | Age: 80
End: 2025-01-14
Payer: MEDICARE

## 2025-01-14 DIAGNOSIS — E55.9 VITAMIN D DEFICIENCY: ICD-10-CM

## 2025-01-14 DIAGNOSIS — M19.012 PRIMARY OSTEOARTHRITIS OF LEFT SHOULDER: Primary | ICD-10-CM

## 2025-01-14 DIAGNOSIS — Z01.89 ENCOUNTER FOR ROUTINE LABORATORY TESTING: ICD-10-CM

## 2025-01-14 DIAGNOSIS — F41.9 ANXIETY: ICD-10-CM

## 2025-01-14 DIAGNOSIS — R79.9 ABNORMAL FINDING OF BLOOD CHEMISTRY, UNSPECIFIED: ICD-10-CM

## 2025-01-14 DIAGNOSIS — E78.00 PURE HYPERCHOLESTEROLEMIA: ICD-10-CM

## 2025-01-14 DIAGNOSIS — I10 BENIGN HYPERTENSION: ICD-10-CM

## 2025-01-14 LAB
25(OH)D3 SERPL-MCNC: 39 NG/ML (ref 30–100)
ALBUMIN SERPL BCP-MCNC: 3.7 G/DL (ref 3.4–5)
ALP SERPL-CCNC: 52 U/L (ref 33–136)
ALT SERPL W P-5'-P-CCNC: 5 U/L (ref 7–45)
ANION GAP SERPL CALCULATED.3IONS-SCNC: 11 MMOL/L (ref 10–20)
AST SERPL W P-5'-P-CCNC: 12 U/L (ref 9–39)
BASOPHILS # BLD AUTO: 0.08 X10*3/UL (ref 0–0.1)
BASOPHILS NFR BLD AUTO: 1.2 %
BILIRUB SERPL-MCNC: 0.5 MG/DL (ref 0–1.2)
BUN SERPL-MCNC: 12 MG/DL (ref 6–23)
CALCIUM SERPL-MCNC: 9.1 MG/DL (ref 8.6–10.3)
CHLORIDE SERPL-SCNC: 103 MMOL/L (ref 98–107)
CHOLEST SERPL-MCNC: 198 MG/DL (ref 0–199)
CHOLEST/HDLC SERPL: 3.1 {RATIO}
CO2 SERPL-SCNC: 29 MMOL/L (ref 21–32)
CREAT SERPL-MCNC: 0.77 MG/DL (ref 0.5–1.05)
EGFRCR SERPLBLD CKD-EPI 2021: 80 ML/MIN/1.73M*2
EOSINOPHIL # BLD AUTO: 0.19 X10*3/UL (ref 0–0.4)
EOSINOPHIL NFR BLD AUTO: 2.9 %
ERYTHROCYTE [DISTWIDTH] IN BLOOD BY AUTOMATED COUNT: 14.6 % (ref 11.5–14.5)
EST. AVERAGE GLUCOSE BLD GHB EST-MCNC: 97 MG/DL
GLUCOSE SERPL-MCNC: 103 MG/DL (ref 74–99)
HBA1C MFR BLD: 5 %
HCT VFR BLD AUTO: 43.3 % (ref 36–46)
HDLC SERPL-MCNC: 64 MG/DL
HGB BLD-MCNC: 14.3 G/DL (ref 12–16)
IMM GRANULOCYTES # BLD AUTO: 0.02 X10*3/UL (ref 0–0.5)
IMM GRANULOCYTES NFR BLD AUTO: 0.3 % (ref 0–0.9)
LDLC SERPL CALC-MCNC: 118 MG/DL
LYMPHOCYTES # BLD AUTO: 1.76 X10*3/UL (ref 0.8–3)
LYMPHOCYTES NFR BLD AUTO: 27.1 %
MCH RBC QN AUTO: 30.3 PG (ref 26–34)
MCHC RBC AUTO-ENTMCNC: 33 G/DL (ref 32–36)
MCV RBC AUTO: 92 FL (ref 80–100)
MONOCYTES # BLD AUTO: 0.72 X10*3/UL (ref 0.05–0.8)
MONOCYTES NFR BLD AUTO: 11.1 %
NEUTROPHILS # BLD AUTO: 3.73 X10*3/UL (ref 1.6–5.5)
NEUTROPHILS NFR BLD AUTO: 57.4 %
NON HDL CHOLESTEROL: 134 MG/DL (ref 0–149)
NRBC BLD-RTO: 0 /100 WBCS (ref 0–0)
PLATELET # BLD AUTO: 267 X10*3/UL (ref 150–450)
POTASSIUM SERPL-SCNC: 5.1 MMOL/L (ref 3.5–5.3)
PROT SERPL-MCNC: 6.5 G/DL (ref 6.4–8.2)
RBC # BLD AUTO: 4.72 X10*6/UL (ref 4–5.2)
SODIUM SERPL-SCNC: 138 MMOL/L (ref 136–145)
TRIGL SERPL-MCNC: 79 MG/DL (ref 0–149)
TSH SERPL-ACNC: 2.56 MIU/L (ref 0.44–3.98)
VLDL: 16 MG/DL (ref 0–40)
WBC # BLD AUTO: 6.5 X10*3/UL (ref 4.4–11.3)

## 2025-01-14 PROCEDURE — 99213 OFFICE O/P EST LOW 20 MIN: CPT | Performed by: ORTHOPAEDIC SURGERY

## 2025-01-14 PROCEDURE — 83036 HEMOGLOBIN GLYCOSYLATED A1C: CPT

## 2025-01-14 PROCEDURE — 1125F AMNT PAIN NOTED PAIN PRSNT: CPT | Performed by: ORTHOPAEDIC SURGERY

## 2025-01-14 PROCEDURE — 82306 VITAMIN D 25 HYDROXY: CPT

## 2025-01-14 PROCEDURE — 1160F RVW MEDS BY RX/DR IN RCRD: CPT | Performed by: ORTHOPAEDIC SURGERY

## 2025-01-14 PROCEDURE — 1159F MED LIST DOCD IN RCRD: CPT | Performed by: ORTHOPAEDIC SURGERY

## 2025-01-14 ASSESSMENT — ENCOUNTER SYMPTOMS
WHEEZING: 0
SHORTNESS OF BREATH: 0
SINUS PRESSURE: 0
ARTHRALGIAS: 1
CHILLS: 0
WOUND: 0
TROUBLE SWALLOWING: 0
JOINT SWELLING: 0
FEVER: 0
EYE DISCHARGE: 0

## 2025-01-14 ASSESSMENT — PAIN SCALES - GENERAL: PAINLEVEL_OUTOF10: 9

## 2025-01-14 ASSESSMENT — PAIN - FUNCTIONAL ASSESSMENT: PAIN_FUNCTIONAL_ASSESSMENT: 0-10

## 2025-01-17 ENCOUNTER — OFFICE VISIT (OUTPATIENT)
Dept: PRIMARY CARE | Facility: CLINIC | Age: 80
End: 2025-01-17
Payer: MEDICARE

## 2025-01-17 VITALS
BODY MASS INDEX: 33.13 KG/M2 | TEMPERATURE: 97.4 F | SYSTOLIC BLOOD PRESSURE: 131 MMHG | OXYGEN SATURATION: 96 % | HEIGHT: 63 IN | HEART RATE: 75 BPM | WEIGHT: 187 LBS | DIASTOLIC BLOOD PRESSURE: 84 MMHG

## 2025-01-17 DIAGNOSIS — F41.9 ANXIETY: ICD-10-CM

## 2025-01-17 DIAGNOSIS — I12.9 CHRONIC KIDNEY DISEASE DUE TO HYPERTENSION: ICD-10-CM

## 2025-01-17 DIAGNOSIS — Z00.00 ENCOUNTER FOR MEDICARE ANNUAL WELLNESS EXAM: Primary | ICD-10-CM

## 2025-01-17 DIAGNOSIS — I10 BENIGN HYPERTENSION: ICD-10-CM

## 2025-01-17 DIAGNOSIS — E78.00 PURE HYPERCHOLESTEROLEMIA: ICD-10-CM

## 2025-01-17 DIAGNOSIS — Z11.59 NEED FOR HEPATITIS C SCREENING TEST: ICD-10-CM

## 2025-01-17 DIAGNOSIS — J06.9 VIRAL URI WITH COUGH: ICD-10-CM

## 2025-01-17 PROCEDURE — 1126F AMNT PAIN NOTED NONE PRSNT: CPT | Performed by: INTERNAL MEDICINE

## 2025-01-17 PROCEDURE — 1159F MED LIST DOCD IN RCRD: CPT | Performed by: INTERNAL MEDICINE

## 2025-01-17 PROCEDURE — 3075F SYST BP GE 130 - 139MM HG: CPT | Performed by: INTERNAL MEDICINE

## 2025-01-17 PROCEDURE — G0439 PPPS, SUBSEQ VISIT: HCPCS | Performed by: INTERNAL MEDICINE

## 2025-01-17 PROCEDURE — 99215 OFFICE O/P EST HI 40 MIN: CPT | Performed by: INTERNAL MEDICINE

## 2025-01-17 PROCEDURE — 99213 OFFICE O/P EST LOW 20 MIN: CPT | Performed by: INTERNAL MEDICINE

## 2025-01-17 PROCEDURE — G2211 COMPLEX E/M VISIT ADD ON: HCPCS | Performed by: INTERNAL MEDICINE

## 2025-01-17 PROCEDURE — 3079F DIAST BP 80-89 MM HG: CPT | Performed by: INTERNAL MEDICINE

## 2025-01-17 PROCEDURE — 1124F ACP DISCUSS-NO DSCNMKR DOCD: CPT | Performed by: INTERNAL MEDICINE

## 2025-01-17 RX ORDER — PRAVASTATIN SODIUM 40 MG/1
40 TABLET ORAL DAILY
Qty: 90 TABLET | Refills: 2 | Status: SHIPPED | OUTPATIENT
Start: 2025-01-17

## 2025-01-17 RX ORDER — LOSARTAN POTASSIUM 50 MG/1
50 TABLET ORAL DAILY
Qty: 90 TABLET | Refills: 2 | Status: SHIPPED | OUTPATIENT
Start: 2025-01-17

## 2025-01-17 RX ORDER — CHOLECALCIFEROL (VITAMIN D3) 25 MCG
1000 TABLET ORAL DAILY
COMMUNITY

## 2025-01-17 RX ORDER — SERTRALINE HYDROCHLORIDE 50 MG/1
50 TABLET, FILM COATED ORAL DAILY
Qty: 90 TABLET | Refills: 2 | Status: SHIPPED | OUTPATIENT
Start: 2025-01-17

## 2025-01-17 RX ORDER — IBUPROFEN 100 MG/5ML
1000 SUSPENSION, ORAL (FINAL DOSE FORM) ORAL 2 TIMES DAILY
COMMUNITY

## 2025-01-17 RX ORDER — HYDROCHLOROTHIAZIDE 12.5 MG/1
12.5 TABLET ORAL DAILY
Qty: 90 TABLET | Refills: 2 | Status: SHIPPED | OUTPATIENT
Start: 2025-01-17

## 2025-01-17 ASSESSMENT — PAIN SCALES - GENERAL: PAINLEVEL_OUTOF10: 0-NO PAIN

## 2025-01-17 NOTE — PROGRESS NOTES
[facility]: [facility]  MEDICARE WELLNESS EXAM      Sue Gar is a 79 y.o. female that is presenting today for Annual Exam (CPE ).    Assessment/Plan    {Assess/Plan SmartLinks (Optional):14058}  ADVANCED CARE PLANNING  Advanced Care Planning was discussed with patient:  The patient does not have an advanced care plan on file. The patient does not have an active surrogate decision-maker on file.  Encouraged the patient to confirm that Living Will and Healthcare Power of  (HCPoA) are accurate and up to date.  Encouraged the patient to confirm that our office be provided a copy of any documentation in the event that anything changes.    ACTIVITIES OF DAILY LIVING  Basic ADLs:  Bathing: Independent, Dressing: Independent, Toileting: Independent, Transferring: Independent, Continence: Independent, Feeding: Independent.    Instrumental ADLs:  Ability to use phone: Independent, Shopping: Independent, Cooking: Independent, House-keeping: Independent, Laundry: Independent, Transportation: Independent, Medication Management: Independent, Finance Management: Dependent ().    Subjective   HPI    - Sue Gar 79 y.o. female is here today for her AWV , BW results and refills. Also been coughing - dry sneezing and blowing her nose since last night          - Patient denies any  symptoms or concerns at this time.       - patient denies any adverse reactions to or concerns with his/her meds.       - Problem list and medication reconciliation done today.  - V.S. Stable. No changes at this time.  - Encouraged continued diet and exercise modification.     Review of Systems  All pertinent POSITIVES as noted per HPI.  All other systems have been reviewed and are NEGATIVE and /or Noncontributory to this patient current visit or complaint.    Objective   Vitals:    01/17/25 1108   BP: 131/84   Pulse: 75   Temp: 36.3 °C (97.4 °F)   SpO2: 96%      Body mass index is 33.13 kg/m².  Physical  Exam  Vitals and nursing note reviewed.   Constitutional:       Appearance: Normal appearance.   HENT:      Head: Normocephalic and atraumatic.      Right Ear: Tympanic membrane, ear canal and external ear normal.      Left Ear: Tympanic membrane, ear canal and external ear normal.      Nose: Nose normal.      Mouth/Throat:      Mouth: Mucous membranes are moist.      Pharynx: Oropharynx is clear.   Eyes:      Extraocular Movements: Extraocular movements intact.      Conjunctiva/sclera: Conjunctivae normal.      Pupils: Pupils are equal, round, and reactive to light.   Neck:      Vascular: No carotid bruit.   Cardiovascular:      Rate and Rhythm: Normal rate and regular rhythm.      Pulses: Normal pulses.      Heart sounds: Normal heart sounds.   Pulmonary:      Effort: Pulmonary effort is normal.      Breath sounds: Normal breath sounds.   Abdominal:      General: Abdomen is flat. Bowel sounds are normal.      Palpations: Abdomen is soft.   Musculoskeletal:         General: No swelling. Normal range of motion.      Cervical back: Normal range of motion and neck supple.   Lymphadenopathy:      Cervical: No cervical adenopathy.   Skin:     General: Skin is warm and dry.   Neurological:      General: No focal deficit present.      Mental Status: She is alert and oriented to person, place, and time. Mental status is at baseline.   Psychiatric:         Mood and Affect: Mood normal.         Behavior: Behavior normal.       Diagnostic Results   Lab Results   Component Value Date    GLUCOSE 103 (H) 01/14/2025    CALCIUM 9.1 01/14/2025     01/14/2025    K 5.1 01/14/2025    CO2 29 01/14/2025     01/14/2025    BUN 12 01/14/2025    CREATININE 0.77 01/14/2025     Lab Results   Component Value Date    ALT 5 (L) 01/14/2025    AST 12 01/14/2025    ALKPHOS 52 01/14/2025    BILITOT 0.5 01/14/2025     Lab Results   Component Value Date    WBC 6.5 01/14/2025    HGB 14.3 01/14/2025    HCT 43.3 01/14/2025    MCV 92  "01/14/2025     01/14/2025     Lab Results   Component Value Date    CHOL 198 01/14/2025    CHOL 199 05/17/2023    CHOL 208 (H) 11/05/2021     Lab Results   Component Value Date    HDL 64.0 01/14/2025    HDL 67 05/17/2023    HDL 63 11/05/2021     Lab Results   Component Value Date    LDLCALC 118 (H) 01/14/2025    LDLCALC 116 05/17/2023    LDLCALC 131 (H) 11/05/2021     Lab Results   Component Value Date    TRIG 79 01/14/2025    TRIG 82 05/17/2023    TRIG 70 11/05/2021     No components found for: \"CHOLHDL\"  Lab Results   Component Value Date    HGBA1C 5.0 01/14/2025     Other labs not included in the list above reviewed either before or during this encounter.    History   Past Medical History:   Diagnosis Date    Combined forms of age-related cataract, bilateral     Corneal disorder due to contact lens, bilateral     Dry eyes     Impacted cerumen of right ear 09/10/2023    Personal history of other diseases of the circulatory system     History of hypertension    Puckering of macula, right eye     Refractive error     Vitreous degeneration, bilateral      Past Surgical History:   Procedure Laterality Date    BREAST BIOPSY      GASTROSTOMY      gallbladder    OTHER SURGICAL HISTORY  04/19/2021    Oral surgery    OTHER SURGICAL HISTORY  04/19/2021    Knee surgery     Family History   Problem Relation Name Age of Onset    Hypertension Mother      Heart disease Father      Hypertension Father      Breast cancer Sister      No Known Problems Son      No Known Problems Son      Stroke Maternal Grandmother      Kidney failure Sibling      Pneumonia Sibling      Other (Liver Failure) Sibling       Social History     Socioeconomic History    Marital status:      Spouse name: Not on file    Number of children: Not on file    Years of education: Not on file    Highest education level: Not on file   Occupational History    Not on file   Tobacco Use    Smoking status: Some Days     Types: Cigarettes     Passive " exposure: Current    Smokeless tobacco: Never   Vaping Use    Vaping status: Never Used   Substance and Sexual Activity    Alcohol use: Yes     Comment: occ    Drug use: Never    Sexual activity: Defer   Other Topics Concern    Not on file   Social History Narrative    Not on file     Social Drivers of Health     Financial Resource Strain: Not on file   Food Insecurity: Not on file   Transportation Needs: Not on file   Physical Activity: Not on file   Stress: Not on file   Social Connections: Not on file   Intimate Partner Violence: Not on file   Housing Stability: Not on file     Allergies   Allergen Reactions    Ciprofloxacin Anaphylaxis     Hives, rash, swelling    Erythromycin Other and Hives     Stomach upset, stabbing pains    Levofloxacin Anaphylaxis    Morphine Anaphylaxis, Other and Unknown     Severe anaphylaxis    VERY SENSITIVE to morphine; given 4mg and became unresponsive and hypotensive, but no hives to suggest allergy; responded to stimulus and IVF, did not require narcan    Moxifloxacin Anaphylaxis     Hives, respiratory distress    Penicillin Anaphylaxis     Hives, respiratory distress    Atorvastatin Myalgia    Iodinated Contrast Media Hives    Oxycodone-Acetaminophen Other     nightmares     Current Outpatient Medications on File Prior to Visit   Medication Sig Dispense Refill    ascorbic acid (Vitamin C) 1,000 mg tablet Take 1 tablet (1,000 mg) by mouth 2 times a day.      cholecalciferol (Vitamin D3) 25 MCG (1000 UT) tablet Take 1 tablet (1,000 Units) by mouth once daily.      hydroCHLOROthiazide (Microzide) 12.5 mg tablet Take 1 tablet (12.5 mg) by mouth once daily. 100 tablet 1    losartan (Cozaar) 50 mg tablet Take 1 tablet (50 mg) by mouth once daily. 100 tablet 1    naproxen sodium (Aleve) 220 mg tablet Take 2 tablets (440 mg) by mouth every 12 hours. Prn      pravastatin (Pravachol) 40 mg tablet Take 1 tablet (40 mg) by mouth once daily. 100 tablet 1    sertraline (Zoloft) 50 mg tablet  Take 1 tablet (50 mg) by mouth once daily. 100 tablet 1     No current facility-administered medications on file prior to visit.     Immunization History   Administered Date(s) Administered    Flu vaccine, quadrivalent, high-dose, preservative free, age 65y+ (FLUZONE) 11/02/2021, 11/10/2022, 10/16/2023    Flu vaccine, trivalent, preservative free, HIGH-DOSE, age 65y+ (Fluzone) 11/02/2021    Influenza, trivalent, adjuvanted 10/22/2024    Pfizer COVID-19 vaccine, 12 years and older, (30mcg/0.3mL) (Comirnaty) 10/16/2023, 10/22/2024    Pfizer COVID-19 vaccine, bivalent, age 12 years and older (30 mcg/0.3 mL) 10/18/2022    Pfizer Purple Cap SARS-CoV-2 03/04/2021, 04/02/2021, 10/12/2021    Pneumococcal polysaccharide vaccine, 23-valent, age 2 years and older (PNEUMOVAX 23) 09/28/2017    Zoster, live 05/11/2010     Patient's medical history was reviewed and updated either before or during this encounter.     Gemma Negro MD   polysaccharide vaccine, 23-valent, age 2 years and older (PNEUMOVAX 23) 09/28/2017    Zoster, live 05/11/2010     Patient's medical history was reviewed and updated either before or during this encounter.     Gemma Negro MD

## 2025-02-04 ENCOUNTER — OFFICE VISIT (OUTPATIENT)
Dept: ORTHOPEDIC SURGERY | Facility: CLINIC | Age: 80
End: 2025-02-04
Payer: MEDICARE

## 2025-02-04 DIAGNOSIS — M25.812 IMPINGEMENT OF LEFT SHOULDER: Primary | ICD-10-CM

## 2025-02-04 PROCEDURE — 1159F MED LIST DOCD IN RCRD: CPT | Performed by: ORTHOPAEDIC SURGERY

## 2025-02-04 PROCEDURE — 2500000004 HC RX 250 GENERAL PHARMACY W/ HCPCS (ALT 636 FOR OP/ED): Performed by: ORTHOPAEDIC SURGERY

## 2025-02-04 PROCEDURE — 20611 DRAIN/INJ JOINT/BURSA W/US: CPT | Mod: LT | Performed by: ORTHOPAEDIC SURGERY

## 2025-02-04 PROCEDURE — 1125F AMNT PAIN NOTED PAIN PRSNT: CPT | Performed by: ORTHOPAEDIC SURGERY

## 2025-02-04 RX ADMIN — BETAMETHASONE DIPROPIONATE 40 MG: 0.5 OINTMENT TOPICAL at 09:29

## 2025-02-04 RX ADMIN — LIDOCAINE HYDROCHLORIDE 3 ML: 10 INJECTION, SOLUTION INFILTRATION; PERINEURAL at 09:29

## 2025-02-04 ASSESSMENT — PAIN SCALES - GENERAL: PAINLEVEL_OUTOF10: 9

## 2025-02-04 ASSESSMENT — PAIN - FUNCTIONAL ASSESSMENT: PAIN_FUNCTIONAL_ASSESSMENT: 0-10

## 2025-02-04 NOTE — PROGRESS NOTES
Patient ID: Sue Gar is a 79 y.o. female.    L Inj/Asp: L subacromial bursa on 2/4/2025 9:29 AM  Indications: pain  Details: 22 G needle, ultrasound-guided  Medications: 40 mg triamcinolone acetonide 40 mg/mL; 3 mL lidocaine 10 mg/mL (1 %)  Outcome: tolerated well, no immediate complications    After discussing the risks and benefits of the procedure with proceeded with an injection.  Using ultrasound guidance we identified the acromion, humeral head and the subacromial bursa, images obtained and saved. We then sterilely injected the left subacrominal space with a mixture of 40 mg of Kenalog and 1 cc of 1 % lidocaine. Pt tolerated the procedure well without any adverse reactions.    Procedure, treatment alternatives, risks and benefits explained, specific risks discussed. Consent was given by the patient. Immediately prior to procedure a time out was called to verify the correct patient, procedure, equipment, support staff and site/side marked as required. Patient was prepped and draped in the usual sterile fashion.           I, Daniela Juarez, attest that this documentation has been prepared under the direction and in the presence of Manuel Phoenix MD.   By signing below, IManuel MD, personally performed the services described in this documentation. All medical record entries made by the scribe were at my direction and in my presence. I have reviewed the chart and agree that the record reflects my personal performance and is accurate and complete.

## 2025-02-05 RX ORDER — LIDOCAINE HYDROCHLORIDE 10 MG/ML
3 INJECTION, SOLUTION INFILTRATION; PERINEURAL
Status: COMPLETED | OUTPATIENT
Start: 2025-02-04 | End: 2025-02-04

## 2025-02-05 RX ORDER — TRIAMCINOLONE ACETONIDE 40 MG/ML
40 INJECTION, SUSPENSION INTRA-ARTICULAR; INTRAMUSCULAR
Status: COMPLETED | OUTPATIENT
Start: 2025-02-04 | End: 2025-02-04

## 2025-02-12 ENCOUNTER — TELEPHONE (OUTPATIENT)
Dept: PRIMARY CARE | Facility: CLINIC | Age: 80
End: 2025-02-12
Payer: MEDICARE

## 2025-02-12 NOTE — TELEPHONE ENCOUNTER
Advised to take Claritin 10 mg daily at night for 4-5 days, hot steamy shower, take tylenol as needed, avoid exposure to cold.

## 2025-02-12 NOTE — TELEPHONE ENCOUNTER
Sruthi pt.  Pt c/o cough with yellow mucus, runny nose, occasional headache x2-3 days.  No vomiting, diarrhea, fever, CP, SOB.  Has not taken anything OTC.  Please provide further instruction for OTC meds.  Ph:  [phone]

## 2025-02-13 ENCOUNTER — OFFICE VISIT (OUTPATIENT)
Dept: URGENT CARE | Age: 80
End: 2025-02-13
Payer: MEDICARE

## 2025-02-13 ENCOUNTER — TELEPHONE (OUTPATIENT)
Dept: PRIMARY CARE | Facility: CLINIC | Age: 80
End: 2025-02-13
Payer: MEDICARE

## 2025-02-13 ENCOUNTER — ANCILLARY PROCEDURE (OUTPATIENT)
Dept: URGENT CARE | Age: 80
End: 2025-02-13
Payer: MEDICARE

## 2025-02-13 VITALS
BODY MASS INDEX: 35.43 KG/M2 | RESPIRATION RATE: 19 BRPM | TEMPERATURE: 97 F | DIASTOLIC BLOOD PRESSURE: 100 MMHG | WEIGHT: 200 LBS | OXYGEN SATURATION: 96 % | HEART RATE: 100 BPM | SYSTOLIC BLOOD PRESSURE: 140 MMHG

## 2025-02-13 DIAGNOSIS — J44.1 COPD EXACERBATION (MULTI): ICD-10-CM

## 2025-02-13 DIAGNOSIS — R06.2 WHEEZE: ICD-10-CM

## 2025-02-13 DIAGNOSIS — R09.89 RHONCHI: ICD-10-CM

## 2025-02-13 DIAGNOSIS — R05.1 ACUTE COUGH: ICD-10-CM

## 2025-02-13 DIAGNOSIS — R05.1 ACUTE COUGH: Primary | ICD-10-CM

## 2025-02-13 PROCEDURE — 3080F DIAST BP >= 90 MM HG: CPT | Performed by: REGISTERED NURSE

## 2025-02-13 PROCEDURE — 99213 OFFICE O/P EST LOW 20 MIN: CPT | Performed by: REGISTERED NURSE

## 2025-02-13 PROCEDURE — 3077F SYST BP >= 140 MM HG: CPT | Performed by: REGISTERED NURSE

## 2025-02-13 PROCEDURE — 1159F MED LIST DOCD IN RCRD: CPT | Performed by: REGISTERED NURSE

## 2025-02-13 PROCEDURE — 96372 THER/PROPH/DIAG INJ SC/IM: CPT | Performed by: REGISTERED NURSE

## 2025-02-13 PROCEDURE — 71046 X-RAY EXAM CHEST 2 VIEWS: CPT | Performed by: REGISTERED NURSE

## 2025-02-13 PROCEDURE — 94640 AIRWAY INHALATION TREATMENT: CPT | Performed by: REGISTERED NURSE

## 2025-02-13 RX ORDER — CLINDAMYCIN HYDROCHLORIDE 300 MG/1
300 CAPSULE ORAL 3 TIMES DAILY
Qty: 21 CAPSULE | Refills: 0 | Status: SHIPPED | OUTPATIENT
Start: 2025-02-13 | End: 2025-02-20

## 2025-02-13 RX ORDER — IPRATROPIUM BROMIDE AND ALBUTEROL SULFATE 2.5; .5 MG/3ML; MG/3ML
3 SOLUTION RESPIRATORY (INHALATION) ONCE
Status: COMPLETED | OUTPATIENT
Start: 2025-02-13 | End: 2025-02-13

## 2025-02-13 RX ORDER — DOXYCYCLINE 100 MG/1
100 CAPSULE ORAL 2 TIMES DAILY
Qty: 20 CAPSULE | Refills: 0 | Status: SHIPPED | OUTPATIENT
Start: 2025-02-13 | End: 2025-02-23

## 2025-02-13 RX ADMIN — Medication 10 MG: at 14:33

## 2025-02-13 RX ADMIN — IPRATROPIUM BROMIDE AND ALBUTEROL SULFATE 3 ML: 2.5; .5 SOLUTION RESPIRATORY (INHALATION) at 14:32

## 2025-02-13 ASSESSMENT — ENCOUNTER SYMPTOMS
HEADACHES: 0
COUGH: 1
CHEST TIGHTNESS: 1
WHEEZING: 1
FATIGUE: 1
RHINORRHEA: 1
ABDOMINAL PAIN: 0
DIARRHEA: 0
VOMITING: 0
FEVER: 0
CHILLS: 0
SHORTNESS OF BREATH: 1
SORE THROAT: 0
NAUSEA: 0

## 2025-02-13 ASSESSMENT — COPD QUESTIONNAIRES: COPD: 1

## 2025-02-13 NOTE — PROGRESS NOTES
Subjective   Patient ID: Sue Gar is a 79 y.o. female. They present today with a chief complaint of Cough (Pt states cough, chest congestion, wheezing x 5 days. ).    History of Present Illness    History provided by:  Patient  Cough  This is a recurrent problem. The current episode started in the past 7 days. The problem has been gradually worsening. The problem occurs constantly. The cough is Non-productive. Associated symptoms include nasal congestion, postnasal drip, rhinorrhea, shortness of breath and wheezing. Pertinent negatives include no chest pain, chills, ear pain, fever, headaches or sore throat. She has tried nothing for the symptoms. Her past medical history is significant for asthma and COPD.       Past Medical History  Allergies as of 02/13/2025 - Reviewed 02/13/2025   Allergen Reaction Noted    Ciprofloxacin Anaphylaxis 09/10/2023    Erythromycin Other and Hives 09/10/2023    Levofloxacin Anaphylaxis 09/10/2023    Morphine Anaphylaxis, Other, and Unknown 09/18/2016    Moxifloxacin Anaphylaxis 09/10/2023    Penicillin Anaphylaxis 09/10/2023    Atorvastatin Myalgia 09/10/2023    Iodinated contrast media Hives 09/10/2023    Oxycodone-acetaminophen Other 09/10/2023       (Not in a hospital admission)       Past Medical History:   Diagnosis Date    Combined forms of age-related cataract, bilateral     Corneal disorder due to contact lens, bilateral     Dry eyes     Impacted cerumen of right ear 09/10/2023    Personal history of other diseases of the circulatory system     History of hypertension    Puckering of macula, right eye     Refractive error     Vitreous degeneration, bilateral        Past Surgical History:   Procedure Laterality Date    BREAST BIOPSY      GASTROSTOMY      gallbladder    OTHER SURGICAL HISTORY  04/19/2021    Oral surgery    OTHER SURGICAL HISTORY  04/19/2021    Knee surgery        reports that she has quit smoking. Her smoking use included cigarettes. She has been exposed  to tobacco smoke. She has never used smokeless tobacco. She reports current alcohol use. She reports that she does not use drugs.    Review of Systems  Review of Systems   Constitutional:  Positive for fatigue. Negative for chills and fever.   HENT:  Positive for congestion, postnasal drip and rhinorrhea. Negative for ear pain and sore throat.    Respiratory:  Positive for cough, chest tightness, shortness of breath and wheezing.    Cardiovascular:  Negative for chest pain.   Gastrointestinal:  Negative for abdominal pain, diarrhea, nausea and vomiting.   Neurological:  Negative for headaches.   All other systems reviewed and are negative.                                 Objective    Vitals:    02/13/25 1404   BP: (!) 140/100   BP Location: Right arm   Patient Position: Sitting   BP Cuff Size: Adult   Pulse: 100   Resp: 19   Temp: 36.1 °C (97 °F)   TempSrc: Skin   SpO2: 96%   Weight: 90.7 kg (200 lb)     No LMP recorded. Patient is postmenopausal.    Physical Exam  Vitals and nursing note reviewed.   Constitutional:       Appearance: Normal appearance.   HENT:      Head: Normocephalic.      Right Ear: Tympanic membrane normal.      Left Ear: Tympanic membrane normal.      Nose: Nose normal.      Mouth/Throat:      Mouth: Mucous membranes are moist.   Eyes:      Pupils: Pupils are equal, round, and reactive to light.   Cardiovascular:      Rate and Rhythm: Normal rate and regular rhythm.   Pulmonary:      Effort: Pulmonary effort is normal.      Breath sounds: Normal breath sounds.      Comments: Scattered rhonchi throughout with wheezing with diminished breath sounds  Abdominal:      General: Bowel sounds are normal.      Palpations: Abdomen is soft.   Skin:     General: Skin is warm and dry.      Capillary Refill: Capillary refill takes less than 2 seconds.   Neurological:      General: No focal deficit present.      Mental Status: She is alert.   Psychiatric:         Mood and Affect: Mood normal.          Procedures    Point of Care Test & Imaging Results from this visit  No results found for this visit on 02/13/25.   XR chest 2 views    Result Date: 2/13/2025  Interpreted By:  Isa Padron, STUDY: XR CHEST 2 VIEWS;  2/13/2025 3:02 pm   INDICATION: Signs/Symptoms:cough.   ,R05.1 Acute cough,R09.89 Other specified symptoms and signs involving the circulatory and respiratory systems,R06.2 Wheezing   COMPARISON: 10/25/2021   ACCESSION NUMBER(S): [id number]   ORDERING CLINICIAN: CRYSTAL SEVERINO   FINDINGS: Mild lobulation/eventration of the right hemidiaphragm similar to the prior exam. No focal infiltrate, pleural effusion or pneumothorax identified. The cardiac silhouette is within normal limits for size. Aortic tortuosity.  Degenerative endplate spurring in the thoracic spine. Arthritic changes of both shoulders.       No acute cardiopulmonary process radiographically.   MACRO: None.   Signed by: Isa Padron 2/13/2025 3:07 PM Dictation workstation:   [id number]     Diagnostic study results (if any) were reviewed by Crystal L Severino, APRN-CNP.    Assessment/Plan   Allergies, medications, history, and pertinent labs/EKGs/Imaging reviewed by Crystal L Severino, APRN-CNP.     Medical Decision Making  79-year-old female with a history of asthma and COPD presents with complaints of cough, chest tightness, shortness of breath, sinus pain pressure and congestion x 5 days.  Upon examination is noted patient is using accessory muscles and is doing a lot of mouth breathing.  She does have some difficulty of completing a full sentence without stopping to take of breath.  Her lung sounds are diminished throughout with scattered rhonchi and wheezing bilaterally.  Her current pulse ox is 96% on room air.  Patient is given a DuoNeb treatment and a dose of Decadron before going down to get a chest x-ray, negative for pneumonia.  Based on patients presenting c/o and symptoms, I will be prescribing antibiotics.  At time  of discharge patient was clinically well-appearing and HDS for outpatient management. The patient and/or family was educated regarding diagnosis, supportive care, OTC and Rx medications. The patient and/or family was given the opportunity to ask questions prior to discharge.  They verbalized understanding of my discussion of the plans for treatment, expected course, indications to return to  or seek further evaluation in ED, and the need for timely follow up as directed.   They were provided with a work/school excuse if requested.      Orders and Diagnoses  Diagnoses and all orders for this visit:  Acute cough  -     ipratropium-albuteroL (Duo-Neb) 0.5-2.5 mg/3 mL nebulizer solution 3 mL  -     dexAMETHasone (Decadron) 10 mg/mL oral liquid 10 mg  -     XR chest 2 views; Future  Rhonchi  -     ipratropium-albuteroL (Duo-Neb) 0.5-2.5 mg/3 mL nebulizer solution 3 mL  -     dexAMETHasone (Decadron) 10 mg/mL oral liquid 10 mg  -     XR chest 2 views; Future  Wheeze  -     ipratropium-albuteroL (Duo-Neb) 0.5-2.5 mg/3 mL nebulizer solution 3 mL  -     dexAMETHasone (Decadron) 10 mg/mL oral liquid 10 mg  -     XR chest 2 views; Future  COPD exacerbation (Multi)  -     doxycycline (Vibramycin) 100 mg capsule; Take 1 capsule (100 mg) by mouth 2 times a day for 10 days. Take with at least 8 ounces (large glass) of water, do not lie down for 30 minutes after  -     clindamycin (Cleocin) 300 mg capsule; Take 1 capsule (300 mg) by mouth 3 times a day for 7 days.      Medical Admin Record  Administrations This Visit       dexAMETHasone (Decadron) 10 mg/mL oral liquid 10 mg       Admin Date  02/13/2025 Action  Given Dose  10 mg Route  oral Documented By  Mildred Laureano MA              ipratropium-albuteroL (Duo-Neb) 0.5-2.5 mg/3 mL nebulizer solution 3 mL       Admin Date  02/13/2025 Action  Given Dose  3 mL Route  nebulization Documented By  Mildred Laureano MA                    Patient disposition: Home    Electronically  signed by Crystal L Severino, APRN-CNP  3:15 PM

## 2025-02-13 NOTE — TELEPHONE ENCOUNTER
Sruthi pt.  Pt called yesterday c/o cough with yellow mucus, runny nose, occasional headache x2-3 days.  No vomiting, diarrhea, fever, CP, SOB.  Has not taken anything OTC.    Per your instructions yesterday, pt was advised to take Claritin 10 mg daily at night for 4-5 days, hot steamy shower, take tylenol as needed, avoid exposure to cold.     Pt states today she is wheezing and believes she may have bronchitis.  Asking what she needs to do now.  Please advise.  Ph:  [phone]

## 2025-02-13 NOTE — PATIENT INSTRUCTIONS
Tylenol/ibuprofen as needed for pain/discomfort  Rest  Mucinex DM  Wash hands frequently  Increase fluid intake

## 2025-05-13 ENCOUNTER — OFFICE VISIT (OUTPATIENT)
Dept: ORTHOPEDIC SURGERY | Facility: CLINIC | Age: 80
End: 2025-05-13
Payer: MEDICARE

## 2025-05-13 DIAGNOSIS — M19.012 OSTEOARTHRITIS OF LEFT SHOULDER, UNSPECIFIED OSTEOARTHRITIS TYPE: Primary | ICD-10-CM

## 2025-05-13 DIAGNOSIS — Z01.818 PREOP EXAMINATION: ICD-10-CM

## 2025-05-13 PROCEDURE — 99213 OFFICE O/P EST LOW 20 MIN: CPT | Performed by: ORTHOPAEDIC SURGERY

## 2025-05-13 PROCEDURE — 1159F MED LIST DOCD IN RCRD: CPT | Performed by: ORTHOPAEDIC SURGERY

## 2025-05-13 PROCEDURE — 2500000004 HC RX 250 GENERAL PHARMACY W/ HCPCS (ALT 636 FOR OP/ED): Mod: JZ | Performed by: ORTHOPAEDIC SURGERY

## 2025-05-13 PROCEDURE — 20611 DRAIN/INJ JOINT/BURSA W/US: CPT | Mod: LT | Performed by: ORTHOPAEDIC SURGERY

## 2025-05-13 PROCEDURE — 1160F RVW MEDS BY RX/DR IN RCRD: CPT | Performed by: ORTHOPAEDIC SURGERY

## 2025-05-13 PROCEDURE — 99213 OFFICE O/P EST LOW 20 MIN: CPT | Mod: 25 | Performed by: ORTHOPAEDIC SURGERY

## 2025-05-13 PROCEDURE — L3670 SO ACRO/CLAV CAN WEB PRE OTS: HCPCS | Performed by: ORTHOPAEDIC SURGERY

## 2025-05-13 RX ADMIN — BETAMETHASONE DIPROPIONATE 40 MG: 0.5 OINTMENT TOPICAL at 09:38

## 2025-05-13 RX ADMIN — LIDOCAINE HYDROCHLORIDE 3 ML: 10 INJECTION, SOLUTION INFILTRATION; PERINEURAL at 09:38

## 2025-05-13 ASSESSMENT — PAIN SCALES - GENERAL: PAINLEVEL_OUTOF10: 10 - WORST POSSIBLE PAIN

## 2025-05-13 ASSESSMENT — COLUMBIA-SUICIDE SEVERITY RATING SCALE - C-SSRS
6. HAVE YOU EVER DONE ANYTHING, STARTED TO DO ANYTHING, OR PREPARED TO DO ANYTHING TO END YOUR LIFE?: NO
2. HAVE YOU ACTUALLY HAD ANY THOUGHTS OF KILLING YOURSELF?: NO
1. IN THE PAST MONTH, HAVE YOU WISHED YOU WERE DEAD OR WISHED YOU COULD GO TO SLEEP AND NOT WAKE UP?: NO

## 2025-05-13 ASSESSMENT — ENCOUNTER SYMPTOMS
CHILLS: 0
FATIGUE: 0
FEVER: 0
SHORTNESS OF BREATH: 0
WHEEZING: 0
ARTHRALGIAS: 1
BRUISES/BLEEDS EASILY: 0

## 2025-05-13 ASSESSMENT — PAIN - FUNCTIONAL ASSESSMENT: PAIN_FUNCTIONAL_ASSESSMENT: 0-10

## 2025-05-13 NOTE — PROGRESS NOTES
Reason for Appointment  Chief Complaint   Patient presents with    Left Shoulder - Pain     History of Present Illness  Patient is a 80 y.o. female here today for follow-up evaluation of left shoulder pain. We last saw the patient on 2/4/25 for left subacromial injection. DEXA on 8/6/24 was reviewed and showed normal. X-rays taken of the left shoulder on 2/26/24 were reviewed and showed severe arthritic change. Today she states the last injection gave her good relief. Her pain has returned. Pain and poor function of the left arm. Hard to raise the arm and do overhead activities. Hard to use the arm and do ADL's. Her pain with radiate up and own the lateral arm. She is interested in discussing surgical intervention today. She has had two knee replacements and so far has been doing good with those.  Her last major surgery was gallbladder surgery. She lives with her . No recent falls or injuries. No other changes in past medical history, allergies, or medications.      Medical History[1]    Surgical History[2]    Medication Documentation Review Audit       Reviewed by Ofelia Mo MA (Medical Assistant) on 02/13/25 at 1410      Medication Order Taking? Sig Documenting Provider Last Dose Status   ascorbic acid (Vitamin C) 1,000 mg tablet [id number]  Take 1 tablet (1,000 mg) by mouth 2 times a day. Historical Provider, MD  Active   cholecalciferol (Vitamin D3) 25 MCG (1000 UT) tablet [id number]  Take 1 tablet (1,000 Units) by mouth once daily. Historical Provider, MD  Active   hydroCHLOROthiazide (Microzide) 12.5 mg tablet [id number]  Take 1 tablet (12.5 mg) by mouth once daily. Gemma Negro MD  Active   losartan (Cozaar) 50 mg tablet [id number]  Take 1 tablet (50 mg) by mouth once daily. Gemma Negro MD  Active   naproxen sodium (Aleve) 220 mg tablet [id number] No Take 2 tablets (440 mg) by mouth every 12 hours. Prn Historical Provider, MD Taking Active   pravastatin (Pravachol) 40 mg tablet [id number]   Take 1 tablet (40 mg) by mouth once daily. Gemma Negro MD  Active   sertraline (Zoloft) 50 mg tablet [id number]  Take 1 tablet (50 mg) by mouth once daily. Gemma Negro MD  Active                    RX Allergies[3]    Review of Systems   Constitutional:  Negative for chills, fatigue and fever.   Respiratory:  Negative for shortness of breath and wheezing.    Cardiovascular:  Negative for chest pain and leg swelling.   Musculoskeletal:  Positive for arthralgias.   Allergic/Immunologic: Negative for immunocompromised state.   Hematological:  Does not bruise/bleed easily.       Exam   Difficult to get the left arm above 90 degrees, the right has about 140 degrees. Deltoid is functional. Only 10 degrees of external rotation. Mild weakness in external rotation. Cmc arthritis. Good elbow hand rom. Good pulses and sensation.     Assessment   Left shoulder severe arthritis    Plan     We had a long discussion about a left shoulder reverse replacement. She would be a good candidate due to her loss of function of the arm and continued pain. We reviewed risks and benefits of surgery, and all questions were answered. We dicussed the recovery phase. We got her consented today. She would like an injection today and to schedule her surgery for around August. She will get a CT scan of the left shoulder for preop.     Today we discussed conservative treatment. At this point, the patient is experiencing increased left shoulder pain that is consistent with osteoarthritis on clinical examination and X-ray with tenderness over the joint line. We will do one cortisone injection into the left shoulder joint in hopes to calm their symptoms nicely. Pt understands the small risk of infection and warning signs including flare reaction.     Patient ID: Sue Gar is a 80 y.o. female.    L Inj/Asp: L glenohumeral on 5/13/2025 9:38 AM  Indications: pain  Details: 22 G needle, ultrasound-guided  Medications: 40 mg triamcinolone  acetonide 40 mg/mL; 3 mL lidocaine 10 mg/mL (1 %)  Outcome: tolerated well, no immediate complications    After discussing the risks and benefits of the procedure we proceeded with the injection. Using ultrasound guidance we anteriorly identified the coracoid process, glenoid and humeral head, also identified the glenohumeral head joint space, images obtained and saved.  We sterilely injected a mixture of 40 mg of Kenalog and 1 cc of 1% lidocaine into the left shoulder joint. Pt tolerated the procedure well without any adverse effects.    Procedure, treatment alternatives, risks and benefits explained, specific risks discussed. Consent was given by the patient. Immediately prior to procedure a time out was called to verify the correct patient, procedure, equipment, support staff and site/side marked as required. Patient was prepped and draped in the usual sterile fashion.           I, Daniela Juarez, attest that this documentation has been prepared under the direction and in the presence of Manuel Phoenix MD.   By signing below, IManuel MD, personally performed the services described in this documentation. All medical record entries made by the scribe were at my direction and in my presence. I have reviewed the chart and agree that the record reflects my personal performance and is accurate and complete.         [1]   Past Medical History:  Diagnosis Date    Combined forms of age-related cataract, bilateral     Corneal disorder due to contact lens, bilateral     Dry eyes     Impacted cerumen of right ear 09/10/2023    Personal history of other diseases of the circulatory system     History of hypertension    Puckering of macula, right eye     Refractive error     Vitreous degeneration, bilateral    [2]   Past Surgical History:  Procedure Laterality Date    BREAST BIOPSY      GASTROSTOMY      gallbladder    OTHER SURGICAL HISTORY  04/19/2021    Oral surgery    OTHER SURGICAL HISTORY  04/19/2021    Knee surgery    [3]   Allergies  Allergen Reactions    Ciprofloxacin Anaphylaxis     Hives, rash, swelling    Erythromycin Other and Hives     Stomach upset, stabbing pains    Levofloxacin Anaphylaxis    Morphine Anaphylaxis, Other and Unknown     Severe anaphylaxis    VERY SENSITIVE to morphine; given 4mg and became unresponsive and hypotensive, but no hives to suggest allergy; responded to stimulus and IVF, did not require narcan    Moxifloxacin Anaphylaxis     Hives, respiratory distress    Penicillin Anaphylaxis     Hives, respiratory distress    Atorvastatin Myalgia    Iodinated Contrast Media Hives    Oxycodone-Acetaminophen Other     nightmares

## 2025-05-14 RX ORDER — TRIAMCINOLONE ACETONIDE 40 MG/ML
40 INJECTION, SUSPENSION INTRA-ARTICULAR; INTRAMUSCULAR
Status: COMPLETED | OUTPATIENT
Start: 2025-05-13 | End: 2025-05-13

## 2025-05-14 RX ORDER — LIDOCAINE HYDROCHLORIDE 10 MG/ML
3 INJECTION, SOLUTION INFILTRATION; PERINEURAL
Status: COMPLETED | OUTPATIENT
Start: 2025-05-13 | End: 2025-05-13

## 2025-05-19 ENCOUNTER — HOSPITAL ENCOUNTER (OUTPATIENT)
Dept: RADIOLOGY | Facility: CLINIC | Age: 80
Discharge: HOME | End: 2025-05-19
Payer: MEDICARE

## 2025-05-19 DIAGNOSIS — Z01.818 PREOP EXAMINATION: ICD-10-CM

## 2025-05-19 DIAGNOSIS — M19.012 OSTEOARTHRITIS OF LEFT SHOULDER, UNSPECIFIED OSTEOARTHRITIS TYPE: ICD-10-CM

## 2025-05-19 PROCEDURE — 73200 CT UPPER EXTREMITY W/O DYE: CPT | Mod: LEFT SIDE | Performed by: RADIOLOGY

## 2025-05-19 PROCEDURE — 73200 CT UPPER EXTREMITY W/O DYE: CPT | Mod: LT

## 2025-05-21 ENCOUNTER — TELEPHONE (OUTPATIENT)
Dept: ORTHOPEDIC SURGERY | Facility: CLINIC | Age: 80
End: 2025-05-21
Payer: MEDICARE

## 2025-07-01 ENCOUNTER — APPOINTMENT (OUTPATIENT)
Dept: PRIMARY CARE | Facility: CLINIC | Age: 80
End: 2025-07-01
Payer: MEDICARE

## 2025-07-01 VITALS
DIASTOLIC BLOOD PRESSURE: 80 MMHG | SYSTOLIC BLOOD PRESSURE: 130 MMHG | HEIGHT: 63 IN | BODY MASS INDEX: 35.44 KG/M2 | WEIGHT: 200 LBS | HEART RATE: 78 BPM | OXYGEN SATURATION: 98 %

## 2025-07-01 DIAGNOSIS — Z01.818 ENCOUNTER FOR PREOPERATIVE EXAMINATION FOR GENERAL SURGICAL PROCEDURE: Primary | ICD-10-CM

## 2025-07-01 DIAGNOSIS — I10 BENIGN HYPERTENSION: ICD-10-CM

## 2025-07-01 DIAGNOSIS — M19.012 PRIMARY OSTEOARTHRITIS OF LEFT SHOULDER: ICD-10-CM

## 2025-07-01 DIAGNOSIS — E78.00 PURE HYPERCHOLESTEROLEMIA: ICD-10-CM

## 2025-07-01 DIAGNOSIS — F41.9 ANXIETY: ICD-10-CM

## 2025-07-01 PROCEDURE — 99214 OFFICE O/P EST MOD 30 MIN: CPT | Performed by: STUDENT IN AN ORGANIZED HEALTH CARE EDUCATION/TRAINING PROGRAM

## 2025-07-01 PROCEDURE — 1159F MED LIST DOCD IN RCRD: CPT | Performed by: STUDENT IN AN ORGANIZED HEALTH CARE EDUCATION/TRAINING PROGRAM

## 2025-07-01 PROCEDURE — 3075F SYST BP GE 130 - 139MM HG: CPT | Performed by: STUDENT IN AN ORGANIZED HEALTH CARE EDUCATION/TRAINING PROGRAM

## 2025-07-01 PROCEDURE — 3079F DIAST BP 80-89 MM HG: CPT | Performed by: STUDENT IN AN ORGANIZED HEALTH CARE EDUCATION/TRAINING PROGRAM

## 2025-07-01 PROCEDURE — 1126F AMNT PAIN NOTED NONE PRSNT: CPT | Performed by: STUDENT IN AN ORGANIZED HEALTH CARE EDUCATION/TRAINING PROGRAM

## 2025-07-01 RX ORDER — LOSARTAN POTASSIUM 50 MG/1
50 TABLET ORAL DAILY
Qty: 90 TABLET | Refills: 3 | Status: SHIPPED | OUTPATIENT
Start: 2025-07-01

## 2025-07-01 RX ORDER — SERTRALINE HYDROCHLORIDE 50 MG/1
50 TABLET, FILM COATED ORAL DAILY
Qty: 90 TABLET | Refills: 3 | Status: SHIPPED | OUTPATIENT
Start: 2025-07-01

## 2025-07-01 RX ORDER — PRAVASTATIN SODIUM 40 MG/1
40 TABLET ORAL DAILY
Qty: 90 TABLET | Refills: 3 | Status: SHIPPED | OUTPATIENT
Start: 2025-07-01

## 2025-07-01 RX ORDER — HYDROCHLOROTHIAZIDE 12.5 MG/1
12.5 TABLET ORAL DAILY
Qty: 90 TABLET | Refills: 3 | Status: SHIPPED | OUTPATIENT
Start: 2025-07-01

## 2025-07-01 ASSESSMENT — ENCOUNTER SYMPTOMS
NERVOUS/ANXIOUS: 0
SHORTNESS OF BREATH: 0
WHEEZING: 0
ARTHRALGIAS: 1
MYALGIAS: 1
DYSPHORIC MOOD: 0
PALPITATIONS: 0
CHOKING: 0

## 2025-07-01 ASSESSMENT — PAIN SCALES - GENERAL: PAINLEVEL_OUTOF10: 0-NO PAIN

## 2025-07-01 ASSESSMENT — COLUMBIA-SUICIDE SEVERITY RATING SCALE - C-SSRS: 1. IN THE PAST MONTH, HAVE YOU WISHED YOU WERE DEAD OR WISHED YOU COULD GO TO SLEEP AND NOT WAKE UP?: NO

## 2025-07-01 NOTE — PROGRESS NOTES
"Subjective   Patient ID: Sue Gar is a 80 y.o. female who presents for New Patient Visit (- not due for AWV /- patient is in office today for a medical clearance for surgery . She is not sure when the her shoulder shoulder is.).    Here today to establish care.     Requesting clearance for left reverse shoulder.  Tentatively August, although final date not set.      History of HTN, HLD, anxiety.          Review of Systems   Respiratory:  Negative for choking, shortness of breath and wheezing.    Cardiovascular:  Negative for chest pain, palpitations and leg swelling.   Musculoskeletal:  Positive for arthralgias and myalgias.   Psychiatric/Behavioral:  Negative for dysphoric mood. The patient is not nervous/anxious.    All other systems reviewed and are negative.      Objective     Visit Vitals  /80   Pulse 78   Ht 1.6 m (5' 3\")   Wt 90.7 kg (200 lb)   SpO2 98%   BMI 35.43 kg/m²   OB Status Postmenopausal   Smoking Status Former   BSA 2.01 m²         Physical Exam  Constitutional:       Appearance: Normal appearance. She is obese.   HENT:      Head: Normocephalic and atraumatic.      Right Ear: External ear normal.      Left Ear: External ear normal.   Eyes:      Conjunctiva/sclera: Conjunctivae normal.   Cardiovascular:      Rate and Rhythm: Normal rate and regular rhythm.      Heart sounds: Normal heart sounds.   Pulmonary:      Effort: Pulmonary effort is normal.      Breath sounds: Normal breath sounds.   Skin:     General: Skin is warm and dry.   Neurological:      Mental Status: She is alert.   Psychiatric:         Mood and Affect: Mood normal.         Behavior: Behavior normal.         Assessment & Plan  Benign hypertension    Orders:    losartan (Cozaar) 50 mg tablet; Take 1 tablet (50 mg) by mouth once daily.    hydroCHLOROthiazide (Microzide) 12.5 mg tablet; Take 1 tablet (12.5 mg) by mouth once daily.    Albumin-Creatinine Ratio, Urine Random; Future  adequately controlled, no changes.   Pure " hypercholesterolemia    Orders:    pravastatin (Pravachol) 40 mg tablet; Take 1 tablet (40 mg) by mouth once daily.    Lipid panel; Future  cholesterol had been slowly increasing in recent draws.  Will consider adding Zetia if worsening.   Anxiety    Orders:    sertraline (Zoloft) 50 mg tablet; Take 1 tablet (50 mg) by mouth once daily.  reports doing well with current dose.  Was on 100 mg at some point in the past.    Encounter for preoperative examination for general surgical procedure    Orders:    CBC and Auto Differential; Future    Comprehensive metabolic panel; Future    Urinalysis with Reflex Microscopic; Future  RCRI is 0 based on most recently labs.  Updated CMP was ordered today to be completed within 30 days of surgical date.   Primary osteoarthritis of left shoulder       will need to determine planned date of surgery.  Patient is likely to be appropriate risk, but cannot say definitively at this time as this needs to be determined closer to the date of surgery.        Reviewed and approved by RADHA BASS on 7/1/25 at 8:44 PM.

## 2025-07-01 NOTE — ASSESSMENT & PLAN NOTE
Orders:    losartan (Cozaar) 50 mg tablet; Take 1 tablet (50 mg) by mouth once daily.    hydroCHLOROthiazide (Microzide) 12.5 mg tablet; Take 1 tablet (12.5 mg) by mouth once daily.    Albumin-Creatinine Ratio, Urine Random; Future  adequately controlled, no changes.

## 2025-07-01 NOTE — ASSESSMENT & PLAN NOTE
will need to determine planned date of surgery.  Patient is likely to be appropriate risk, but cannot say definitively at this time as this needs to be determined closer to the date of surgery.

## 2025-07-01 NOTE — ASSESSMENT & PLAN NOTE
Orders:    pravastatin (Pravachol) 40 mg tablet; Take 1 tablet (40 mg) by mouth once daily.    Lipid panel; Future  cholesterol had been slowly increasing in recent draws.  Will consider adding Zetia if worsening.

## 2025-07-01 NOTE — ASSESSMENT & PLAN NOTE
Orders:    sertraline (Zoloft) 50 mg tablet; Take 1 tablet (50 mg) by mouth once daily.  reports doing well with current dose.  Was on 100 mg at some point in the past.

## 2025-07-15 ENCOUNTER — TELEPHONE (OUTPATIENT)
Dept: PRIMARY CARE | Facility: CLINIC | Age: 80
End: 2025-07-15

## 2025-07-15 ENCOUNTER — PRE-ADMISSION TESTING (OUTPATIENT)
Dept: PREADMISSION TESTING | Facility: HOSPITAL | Age: 80
End: 2025-07-15
Payer: MEDICARE

## 2025-07-15 VITALS
DIASTOLIC BLOOD PRESSURE: 86 MMHG | TEMPERATURE: 96.6 F | HEIGHT: 63 IN | HEART RATE: 85 BPM | WEIGHT: 201.94 LBS | OXYGEN SATURATION: 98 % | SYSTOLIC BLOOD PRESSURE: 113 MMHG | BODY MASS INDEX: 35.78 KG/M2 | RESPIRATION RATE: 18 BRPM

## 2025-07-15 DIAGNOSIS — Z01.818 ENCOUNTER FOR PREADMISSION TESTING: Primary | ICD-10-CM

## 2025-07-15 PROCEDURE — 87081 CULTURE SCREEN ONLY: CPT | Mod: WESLAB

## 2025-07-15 PROCEDURE — 93005 ELECTROCARDIOGRAM TRACING: CPT

## 2025-07-15 PROCEDURE — 93010 ELECTROCARDIOGRAM REPORT: CPT | Performed by: INTERNAL MEDICINE

## 2025-07-15 RX ORDER — CHLORHEXIDINE GLUCONATE ORAL RINSE 1.2 MG/ML
SOLUTION DENTAL
Qty: 473 ML | Refills: 0 | Status: SHIPPED | OUTPATIENT
Start: 2025-07-15

## 2025-07-15 ASSESSMENT — DUKE ACTIVITY SCORE INDEX (DASI)
TOTAL_SCORE: 32.2
CAN YOU DO YARD WORK LIKE RAKING LEAVES, WEEDING OR PUSHING A MOWER: NO
CAN YOU DO MODERATE WORK AROUND THE HOUSE LIKE VACUUMING, SWEEPING FLOORS OR CARRYING GROCERIES: YES
CAN YOU HAVE SEXUAL RELATIONS: YES
CAN YOU DO HEAVY WORK AROUND THE HOUSE LIKE SCRUBBING FLOORS OR LIFTING AND MOVING HEAVY FURNITURE: YES
CAN YOU RUN A SHORT DISTANCE: NO
CAN YOU PARTICIPATE IN MODERATE RECREATIONAL ACTIVITIES LIKE GOLF, BOWLING, DANCING, DOUBLES TENNIS OR THROWING A BASEBALL OR FOOTBALL: NO
CAN YOU DO LIGHT WORK AROUND THE HOUSE LIKE DUSTING OR WASHING DISHES: YES
DASI METS SCORE: 6.7
CAN YOU WALK INDOORS, SUCH AS AROUND YOUR HOUSE: YES
CAN YOU TAKE CARE OF YOURSELF (EAT, DRESS, BATHE, OR USE TOILET): YES
CAN YOU WALK A BLOCK OR TWO ON LEVEL GROUND: YES
CAN YOU PARTICIPATE IN STRENOUS SPORTS LIKE SWIMMING, SINGLES TENNIS, FOOTBALL, BASKETBALL, OR SKIING: NO
CAN YOU CLIMB A FLIGHT OF STAIRS OR WALK UP A HILL: YES

## 2025-07-15 ASSESSMENT — ENCOUNTER SYMPTOMS
ARTHRALGIAS: 1
EYES NEGATIVE: 1
CONSTITUTIONAL NEGATIVE: 1
GASTROINTESTINAL NEGATIVE: 1
NECK NEGATIVE: 1
CARDIOVASCULAR NEGATIVE: 1
ENDOCRINE NEGATIVE: 1
RESPIRATORY NEGATIVE: 1
NEUROLOGICAL NEGATIVE: 1

## 2025-07-15 ASSESSMENT — PAIN SCALES - GENERAL: PAINLEVEL_OUTOF10: 0 - NO PAIN

## 2025-07-15 ASSESSMENT — PAIN - FUNCTIONAL ASSESSMENT: PAIN_FUNCTIONAL_ASSESSMENT: 0-10

## 2025-07-15 NOTE — H&P (VIEW-ONLY)
CPM/PAT Evaluation       Name: Sue Gar (Sue Gar)  /Age: 1945/80 y.o.     In-Person       Chief Complaint: left shoulder pain    This is a 79 y/o female with PMHX significant for HTN, HLD, cataracts, and severe arthritis of left shoulder. Patient follows with Dr. Phoenix for eft shoulder pain-s/p subacromial injection.  DEXA on 24 was normal. X-rays taken of the left shoulder on 24 revealed severe arthritic change. Now with pain and poor function of left arm. Scheduled for arthroplasty, shoulder total reverse - left with Dr. Phoenix at [place] on 25.    Patient denied fever, chills, chest pain, shortness of breath, abdominal pain, nausea, vomiting, diarrhea, or urinary symptoms.  Patient denied history of DM2, CVA, CAD, PE or DVT        Medical History[1]    Surgical History[2]    Patient Sexual activity questions deferred to the physician.    Family History[3]    Allergies[4]    Prior to Admission medications    Medication Sig Start Date End Date Taking? Authorizing Provider   cholecalciferol (Vitamin D3) 25 MCG (1000 UT) tablet Take 1 tablet (1,000 Units) by mouth once daily.   Yes Historical Provider, MD   hydroCHLOROthiazide (Microzide) 12.5 mg tablet Take 1 tablet (12.5 mg) by mouth once daily. 25  Yes Malcolm Hylton DO   losartan (Cozaar) 50 mg tablet Take 1 tablet (50 mg) by mouth once daily. 25  Yes Malcolm Hylton DO   pravastatin (Pravachol) 40 mg tablet Take 1 tablet (40 mg) by mouth once daily. 25  Yes Malcolm Hylton DO   sertraline (Zoloft) 50 mg tablet Take 1 tablet (50 mg) by mouth once daily. 25  Yes Malcolm Hylton DO   ascorbic acid (Vitamin C) 1,000 mg tablet Take 1 tablet (1,000 mg) by mouth 2 times a day.  7/15/25  Historical Provider, MD   naproxen sodium (Aleve) 220 mg tablet Take 2 tablets (440 mg) by mouth every 12 hours. Prn  7/15/25  Historical Provider, MD GIRARD ROS:   Constitutional:   neg    Neuro/Psych:   neg    Eyes:   neg    Ears:  "  neg    Nose:   neg    Mouth:   neg    Throat:   neg    Neck:   neg    Cardio:   neg    Respiratory:   neg    Endocrine:   neg    GI:   neg    :   neg    Musculoskeletal:    arthralgias  Hematologic:   neg    Skin:  neg        Physical Exam  Vitals reviewed.   Constitutional:       Appearance: Normal appearance.   HENT:      Head: Normocephalic and atraumatic.      Right Ear: External ear normal.      Left Ear: External ear normal.      Nose: Nose normal.      Mouth/Throat:      Mouth: Mucous membranes are moist.      Pharynx: Oropharynx is clear.   Eyes:      Extraocular Movements: Extraocular movements intact.      Conjunctiva/sclera: Conjunctivae normal.      Pupils: Pupils are equal, round, and reactive to light.   Cardiovascular:      Rate and Rhythm: Normal rate and regular rhythm.      Pulses: Normal pulses.   Pulmonary:      Effort: Pulmonary effort is normal.      Breath sounds: Normal breath sounds.   Abdominal:      General: Bowel sounds are normal.      Palpations: Abdomen is soft.   Musculoskeletal:      Cervical back: Normal range of motion and neck supple.      Comments: Left UE with decreased ROM   Skin:     General: Skin is warm and dry.      Capillary Refill: Capillary refill takes less than 2 seconds.   Neurological:      General: No focal deficit present.      Mental Status: She is alert and oriented to person, place, and time.   Psychiatric:         Mood and Affect: Mood normal.         Behavior: Behavior normal.        PAT AIRWAY:   Airway:     Mallampati::  III    TM distance::  >3 FB    Neck ROM::  Full   partials      Visit Vitals  /86   Pulse 85   Temp 35.9 °C (96.6 °F) (Temporal)   Resp 18   Ht 1.6 m (5' 3\")   Wt 91.6 kg (201 lb 15.1 oz)   SpO2 98%   BMI 35.77 kg/m²   OB Status Postmenopausal   Smoking Status Every Day   BSA 2.02 m²       CHADS2: 4.0%  RCRI: 0.4%  ASA: II  DASI: 32.2  METS: 6.7  ARISCAT: 1.6%    Assessment and Plan:   Left shoulder pain-s/p subacromial injection.  " DEXA on 8/6/24 was normal. X-rays taken of the left shoulder on 2/26/24 revealed severe arthritic change. Now with pain and poor function of left arm. Scheduled for arthroplasty, shoulder total reverse - left with Dr. Phoenix at [place] on 7/23/25. Clearance by Dr. Hylton pending labs/surgical date per patient. Cardiac clearance pending - appointment with Dr. Sadler 7/16/25.  HTN -stable on Cozaar and hydrochlorothiazide PTA  HLD-resume PTA statin  Mood disorder - resume PTA Zoloft  Tobacco abuse - current everyday smoker-30 pack year history-cessation discussed  Obesity - BMI 35.77    Labs/Tests:  EKG NSR with PAC's  CBC/CMP pending  U/A pending  PCP and Cardiac clearance pending    Dai Muhammad APRN-CNP, DNP           [1]   Past Medical History:  Diagnosis Date    Combined forms of age-related cataract, bilateral     Corneal disorder due to contact lens, bilateral     Dry eyes     Impacted cerumen of right ear 09/10/2023    Personal history of other diseases of the circulatory system     History of hypertension    Puckering of macula, right eye     Refractive error     Vitreous degeneration, bilateral    [2]   Past Surgical History:  Procedure Laterality Date    BREAST BIOPSY      GASTROSTOMY      gallbladder    OTHER SURGICAL HISTORY  04/19/2021    Oral surgery    OTHER SURGICAL HISTORY  04/19/2021    Knee surgery   [3]   Family History  Problem Relation Name Age of Onset    Hypertension Mother      Heart disease Father      Hypertension Father      Breast cancer Sister      No Known Problems Son      No Known Problems Son      Stroke Maternal Grandmother      Kidney failure Sibling      Pneumonia Sibling      Other (Liver Failure) Sibling     [4]   Allergies  Allergen Reactions    Ciprofloxacin Anaphylaxis     Hives, rash, swelling    Erythromycin Other and Hives     Stomach upset, stabbing pains    Levaquin [Levofloxacin] Anaphylaxis    Morphine Anaphylaxis, Other and Unknown     Severe anaphylaxis    VERY  SENSITIVE to morphine; given 4mg and became unresponsive and hypotensive, but no hives to suggest allergy; responded to stimulus and IVF, did not require narcan    Moxifloxacin Anaphylaxis     Hives, respiratory distress    Penicillin Anaphylaxis     Hives, respiratory distress    Atorvastatin Myalgia    Iodinated Contrast Media Hives    Percocet [Oxycodone-Acetaminophen] Other     nightmares

## 2025-07-15 NOTE — CPM/PAT H&P
CPM/PAT Evaluation       Name: Sue Gar (Sue Gar)  /Age: 1945/80 y.o.     In-Person       Chief Complaint: left shoulder pain    This is a 79 y/o female with PMHX significant for HTN, HLD, cataracts, and severe arthritis of left shoulder. Patient follows with Dr. Phoenix for eft shoulder pain-s/p subacromial injection.  DEXA on 24 was normal. X-rays taken of the left shoulder on 24 revealed severe arthritic change. Now with pain and poor function of left arm. Scheduled for arthroplasty, shoulder total reverse - left with Dr. Phoenix at [place] on 25.    Patient denied fever, chills, chest pain, shortness of breath, abdominal pain, nausea, vomiting, diarrhea, or urinary symptoms.  Patient denied history of DM2, CVA, CAD, PE or DVT        Medical History[1]    Surgical History[2]    Patient Sexual activity questions deferred to the physician.    Family History[3]    Allergies[4]    Prior to Admission medications    Medication Sig Start Date End Date Taking? Authorizing Provider   cholecalciferol (Vitamin D3) 25 MCG (1000 UT) tablet Take 1 tablet (1,000 Units) by mouth once daily.   Yes Historical Provider, MD   hydroCHLOROthiazide (Microzide) 12.5 mg tablet Take 1 tablet (12.5 mg) by mouth once daily. 25  Yes Malcolm Hylton DO   losartan (Cozaar) 50 mg tablet Take 1 tablet (50 mg) by mouth once daily. 25  Yes Malcolm Hylton DO   pravastatin (Pravachol) 40 mg tablet Take 1 tablet (40 mg) by mouth once daily. 25  Yes Malcolm Hylton DO   sertraline (Zoloft) 50 mg tablet Take 1 tablet (50 mg) by mouth once daily. 25  Yes Malcolm Hylton DO   ascorbic acid (Vitamin C) 1,000 mg tablet Take 1 tablet (1,000 mg) by mouth 2 times a day.  7/15/25  Historical Provider, MD   naproxen sodium (Aleve) 220 mg tablet Take 2 tablets (440 mg) by mouth every 12 hours. Prn  7/15/25  Historical Provider, MD GIRARD ROS:   Constitutional:   neg    Neuro/Psych:   neg    Eyes:   neg    Ears:  "  neg    Nose:   neg    Mouth:   neg    Throat:   neg    Neck:   neg    Cardio:   neg    Respiratory:   neg    Endocrine:   neg    GI:   neg    :   neg    Musculoskeletal:    arthralgias  Hematologic:   neg    Skin:  neg        Physical Exam  Vitals reviewed.   Constitutional:       Appearance: Normal appearance.   HENT:      Head: Normocephalic and atraumatic.      Right Ear: External ear normal.      Left Ear: External ear normal.      Nose: Nose normal.      Mouth/Throat:      Mouth: Mucous membranes are moist.      Pharynx: Oropharynx is clear.   Eyes:      Extraocular Movements: Extraocular movements intact.      Conjunctiva/sclera: Conjunctivae normal.      Pupils: Pupils are equal, round, and reactive to light.   Cardiovascular:      Rate and Rhythm: Normal rate and regular rhythm.      Pulses: Normal pulses.   Pulmonary:      Effort: Pulmonary effort is normal.      Breath sounds: Normal breath sounds.   Abdominal:      General: Bowel sounds are normal.      Palpations: Abdomen is soft.   Musculoskeletal:      Cervical back: Normal range of motion and neck supple.      Comments: Left UE with decreased ROM   Skin:     General: Skin is warm and dry.      Capillary Refill: Capillary refill takes less than 2 seconds.   Neurological:      General: No focal deficit present.      Mental Status: She is alert and oriented to person, place, and time.   Psychiatric:         Mood and Affect: Mood normal.         Behavior: Behavior normal.        PAT AIRWAY:   Airway:     Mallampati::  III    TM distance::  >3 FB    Neck ROM::  Full   partials      Visit Vitals  /86   Pulse 85   Temp 35.9 °C (96.6 °F) (Temporal)   Resp 18   Ht 1.6 m (5' 3\")   Wt 91.6 kg (201 lb 15.1 oz)   SpO2 98%   BMI 35.77 kg/m²   OB Status Postmenopausal   Smoking Status Every Day   BSA 2.02 m²       CHADS2: 4.0%  RCRI: 0.4%  ASA: II  DASI: 32.2  METS: 6.7  ARISCAT: 1.6%    Assessment and Plan:   Left shoulder pain-s/p subacromial injection.  " DEXA on 8/6/24 was normal. X-rays taken of the left shoulder on 2/26/24 revealed severe arthritic change. Now with pain and poor function of left arm. Scheduled for arthroplasty, shoulder total reverse - left with Dr. Phoenix at [place] on 7/23/25. Clearance by Dr. Hylton pending labs/surgical date per patient. Cardiac clearance pending - appointment with Dr. Sadler 7/16/25.  HTN -stable on Cozaar and hydrochlorothiazide PTA  HLD-resume PTA statin  Mood disorder - resume PTA Zoloft  Tobacco abuse - current everyday smoker-30 pack year history-cessation discussed  Obesity - BMI 35.77    Labs/Tests:  EKG NSR with PAC's  CBC/CMP pending  U/A pending  PCP and Cardiac clearance pending    Dai Muhammad APRN-CNP, DNP           [1]   Past Medical History:  Diagnosis Date    Combined forms of age-related cataract, bilateral     Corneal disorder due to contact lens, bilateral     Dry eyes     Impacted cerumen of right ear 09/10/2023    Personal history of other diseases of the circulatory system     History of hypertension    Puckering of macula, right eye     Refractive error     Vitreous degeneration, bilateral    [2]   Past Surgical History:  Procedure Laterality Date    BREAST BIOPSY      GASTROSTOMY      gallbladder    OTHER SURGICAL HISTORY  04/19/2021    Oral surgery    OTHER SURGICAL HISTORY  04/19/2021    Knee surgery   [3]   Family History  Problem Relation Name Age of Onset    Hypertension Mother      Heart disease Father      Hypertension Father      Breast cancer Sister      No Known Problems Son      No Known Problems Son      Stroke Maternal Grandmother      Kidney failure Sibling      Pneumonia Sibling      Other (Liver Failure) Sibling     [4]   Allergies  Allergen Reactions    Ciprofloxacin Anaphylaxis     Hives, rash, swelling    Erythromycin Other and Hives     Stomach upset, stabbing pains    Levaquin [Levofloxacin] Anaphylaxis    Morphine Anaphylaxis, Other and Unknown     Severe anaphylaxis    VERY  SENSITIVE to morphine; given 4mg and became unresponsive and hypotensive, but no hives to suggest allergy; responded to stimulus and IVF, did not require narcan    Moxifloxacin Anaphylaxis     Hives, respiratory distress    Penicillin Anaphylaxis     Hives, respiratory distress    Atorvastatin Myalgia    Iodinated Contrast Media Hives    Percocet [Oxycodone-Acetaminophen] Other     nightmares

## 2025-07-15 NOTE — TELEPHONE ENCOUNTER
Patient called and stated she would like [facility] to know that her surgery is 07-23-25 and she will not know the time until 07-22-25.    Patient can be reached at [phone]

## 2025-07-15 NOTE — PREPROCEDURE INSTRUCTIONS
Medication List            Accurate as of July 15, 2025  9:26 AM. Always use your most recent med list.                hydroCHLOROthiazide 12.5 mg tablet  Commonly known as: Microzide  Take 1 tablet (12.5 mg) by mouth once daily.  Medication Adjustments for Surgery: Take on the morning of surgery     losartan 50 mg tablet  Commonly known as: Cozaar  Take 1 tablet (50 mg) by mouth once daily.  Medication Adjustments for Surgery: Do Not take on the morning of surgery     pravastatin 40 mg tablet  Commonly known as: Pravachol  Take 1 tablet (40 mg) by mouth once daily.  Medication Adjustments for Surgery: Take on the morning of surgery     sertraline 50 mg tablet  Commonly known as: Zoloft  Take 1 tablet (50 mg) by mouth once daily.  Medication Adjustments for Surgery: Take on the morning of surgery     Vitamin D3 25 mcg (1,000 units) tablet  Generic drug: cholecalciferol  Additional Medication Adjustments for Surgery: Take last dose 7 days before surgery                     Preoperative Fasting Guidelines    Why must I stop eating and drinking near surgery time?  With sedation, food or liquid in your stomach can enter your lungs causing serious complications  Increases nausea and vomiting    When do I need to stop eating and drinking before my surgery?  Do not eat any food after midnight the night before your surgery/procedure.  You may have up to 13.5 ounces of clear liquid until TWO hours before your instructed arrival time to the hospital.  This includes water, black tea/coffee, (no milk or cream) apple juice, and electrolyte drinks (Gatorade)  You may chew gum until TWO hours before your surgery/procedure    PAT DISCHARGE INSTRUCTIONS    The Same Day Surgery (SDS) Department of the hospital where your procedure will be performed will contact you after 2:00 PM the day before your surgery. If you are scheduled on a Monday, or a Tuesday following a Monday holiday, they will call on the last business day prior to  your surgery.  Please check your voicemail for any missed messages.    [facility]  [address] [place] [address] [phone]  Second Floor      [facility]  [address], [address] [phone]  Second Floor  *PLEASE CALL ABOVE NUMBER FOR ARRIVAL TIME       [facility]  [address]  [place], [place] [id number] [phone]    Please let your surgeon know if:      You develop any open sores, shingles, burning or painful urination as these may increase your risk of an infection.   You no longer wish to have the surgery.   Any other personal circumstances change that may lead to the need to cancel or defer this surgery-such as being sick or getting admitted to any hospital within one week of your planned procedure.    Your contact details change, such as a change of address or phone number.    Starting now:     Please DO NOT drink alcohol or smoke for 24 hours before surgery. It is well known that quitting smoking can make a huge difference to your health and recovery from surgery. The longer you abstain from smoking, the better your chances of a healthy recovery. If you need help with quitting, call 1-511-QUIT-NOW to be connected to a trained counselor who will discuss the best methods to help you quit.     Before your surgery:    Please stop all supplements 7 days prior to surgery. Or as directed by your surgeon.   Please stop taking NSAID pain medicine such as Advil and Motrin 7 days before surgery. - tylenol is ok   If you develop any fever, cough, cold, rashes, cuts, scratches, scrapes, urinary symptoms or infection anywhere on your body (including teeth and gums) prior to surgery, please call your surgeon’s office as soon as possible. This may require treatment to reduce the chance of cancellation on the day of surgery.    The day before your surgery:   DIET- Please follow the diet  instructions at the top of your packet.   Get a good night’s rest.  Use the special soap for bathing if you have been instructed to use one.    Scheduled surgery times may change and you will be notified if this occurs - please check your personal voicemail for any updates.     On the morning of surgery:   Wear comfortable, loose fitting clothes which open in the front. Please do not wear moisturizers, creams, lotions, makeup or perfume.    Please bring with you to surgery:   Photo ID and insurance card   Current list of medicines and allergies   Pacemaker/ Defibrillator/Heart stent cards   CPAP machine and mask    Slings/ splints/ crutches   A copy of your complete advanced directive/DHPOA.    Please do NOT bring with you to surgery:   All jewelry and valuables should be left at home.   Prosthetic devices such as contact lenses, hearing aids, dentures, eyelash extensions, hairpins and body piercings must be removed prior to going in to the surgical suite.    After outpatient surgery:   A responsible adult MUST accompany you at the time of discharge and stay with you for 24 hours after your surgery. You may NOT drive yourself home after surgery.    Do not drive, operate machinery, make critical decisions or do activities that require co-ordination or balance until after a night’s sleep.   Do not drink alcoholic beverages for 24 hours.   Instructions for resuming your medications will be provided by your surgeon.    CALL YOUR DOCTOR AFTER SURGERY IF YOU HAVE:     Chills and/or a fever of 101° F or higher.    Redness, swelling, pus or drainage from your surgical wound or a bad smell from the wound.    Lightheadedness, fainting or confusion.    Persistent vomiting (throwing up) and are not able to eat or drink for 12 hours.    Three or more loose, watery bowel movements in 24 hours (diarrhea).   Difficulty or pain while urinating( after non-urological surgery)    Pain and swelling in your legs, especially if it is only  on one side.    Difficulty breathing or are breathing faster than normal.    Any new concerning symptoms.      Patient Information: Pre-Operative Infection Prevention Measures     Why did I have my nose, under my arms, and groin swabbed?  The purpose of the swab is to identify Staphylococcus aureus inside your nose or on your skin.  The swab was sent to the laboratory for culture.  A positive swab/culture for Staphylococcus aureus is called colonization or carriage.      What is Staphylococcus aureus?  Staphylococcus aureus, also known as “staph”, is a germ found on the skin or in the nose of healthy people.  Sometimes Staphylococcus aureus can get into the body and cause an infection.  This can be minor (such as pimples, boils, or other skin problems).  It might also be serious (such as a blood infection, pneumonia, or a surgical site infection).    What is Staphylococcus aureus colonization or carriage?  Colonization or carriage means that a person has the germ but is not sick from it.  These bacteria can be spread on the hands or when breathing or sneezing.    How is Staphylococcus aureus spread?  It is most often spread by close contact with a person or item that carries it.    What happens if my culture is positive for Staphylococcus aureus?  Your doctor/medical team will use this information to guide any antibiotic treatment which may be necessary.  Regardless of the culture results, we will clean the inside of your nose with a betadine swab just before you have your surgery.      Will I get an infection if I have Staphylococcus aureus in my nose or on my skin?  Anyone can get an infection with Staphylococcus aureus.  However, the best way to reduce your risk of infection is to follow the instructions provided to you for the use of your CHG soap and dental rinse.        Patient Information: Oral/Dental Rinse    What is oral/dental rinse?   It is a mouthwash. It is a way of cleaning the mouth with a  germ-killing solution before your surgery.  The solution contains chlorhexidine, commonly known as CHG.   It is used inside the mouth to kill a bacteria known as Staphylococcus aureus.  Let your doctor know if you are allergic to Chlorhexidine.    Why do I need to use CHG oral/dental rinse?  The CHG oral/dental rinse helps to kill a bacteria in your mouth known as Staphylococcus aureus.     This reduces the risk of infection at the surgical site.      Using your CHG oral/dental rinse  STEPS:  Use your CHG oral/dental rinse after you brush your teeth the night before (at bedtime) and the morning of your surgery.  Follow all directions on your prescription label.    Use the cap on the container to measure 15ml   Swish (gargle if you can) the mouthwash in your mouth for at least 30 seconds, (do not swallow) and spit out  After you use your CHG rinse, do not rinse your mouth with water, drink or eat.  Please refer to the prescription label for the appropriate time to resume oral intake      What side effects might I have using the CHG oral/dental rinse?  CHG rinse will stick to plaque on the teeth.  Brush and floss just before use.  Teeth brushing will help avoid staining of plaque during use.      Patient Information: Home Preoperative Antibacterial Shower      What is a home preoperative antibacterial shower?  This shower is a way of cleaning the skin with a germ-killing solution before surgery.  The solution contains chlorhexidine, commonly known as CHG.  CHG is a skin cleanser with germ-killing ability.  Let your doctor know if you are allergic to chlorhexidine.    Why do I need to take a preoperative antibacterial shower?  Skin is not sterile.  It is best to try to make your skin as free of germs as possible before surgery.  Proper cleansing with a germ-killing soap before surgery can lower the number of germs on your skin.  This helps to reduce the risk of infection at the surgical site.  Following the  instructions listed below will help you prepare your skin for surgery.      How do I use the solution?  Steps:  Begin using your CHG soap 5 days before your scheduled surgery on ____7/23/25 -- start wash 7/19/25______.    First, wash and rinse your hair using the CHG soap. Keep CHG soap away from ear canals and eyes.  Rinse completely, do not condition.  Hair extensions should be removed.  Wash your face with your normal soap and rinse.    Apply the CHG solution to a clean wet washcloth.  Turn the water off or move away from the water spray to avoid premature rinsing of the CHG soap as you are applying.   Firmly lather your entire body from the neck down.  Do not use on your face.  Pay special attention to the area(s) where your incision(s) will be located unless they are on your face.  Avoid scrubbing your skin too hard.  The important point is to have the CHG soap sit on your skin for 3 minutes.    When the 3 minutes are up, turn on the water and rinse the CHG solution off your body completely.   DO NOT wash with regular soap after you have used the CHG soap solution  Pat yourself dry with a clean, freshly-laundered towel.  DO NOT apply powders, deodorants, or lotions.  Dress in clean, freshly laundered nightclothes.    Be sure to sleep with clean, freshly laundered sheets.  Be aware that CHG will cause stains on fabrics; if you wash them with bleach after use.  Rinse your washcloth and other linens that have contact with CHG completely.  Use only non-chlorine detergents to launder the items used.   The morning of surgery is the fifth day.  Repeat the above steps and dress in clean comfortable clothing     Whom should I contact if I have any questions regarding the use of CHG soap?  Call the [facility] at [phone] if you have any questions.

## 2025-07-16 LAB
ALBUMIN SERPL-MCNC: 3.8 G/DL (ref 3.6–5.1)
ALBUMIN/CREAT UR: 2 MG/G CREAT
ALP SERPL-CCNC: 50 U/L (ref 37–153)
ALT SERPL-CCNC: 6 U/L (ref 6–29)
ANION GAP SERPL CALCULATED.4IONS-SCNC: 9 MMOL/L (CALC) (ref 7–17)
AST SERPL-CCNC: 13 U/L (ref 10–35)
BASOPHILS # BLD AUTO: 97 CELLS/UL (ref 0–200)
BASOPHILS NFR BLD AUTO: 1.2 %
BILIRUB SERPL-MCNC: 0.6 MG/DL (ref 0.2–1.2)
BUN SERPL-MCNC: 12 MG/DL (ref 7–25)
CALCIUM SERPL-MCNC: 9.1 MG/DL (ref 8.6–10.4)
CHLORIDE SERPL-SCNC: 103 MMOL/L (ref 98–110)
CHOLEST SERPL-MCNC: 204 MG/DL
CHOLEST/HDLC SERPL: 3 (CALC)
CO2 SERPL-SCNC: 25 MMOL/L (ref 20–32)
CREAT SERPL-MCNC: 0.74 MG/DL (ref 0.6–1)
CREAT UR-MCNC: 107 MG/DL (ref 20–275)
EGFRCR SERPLBLD CKD-EPI 2021: 83 ML/MIN/1.73M2
EOSINOPHIL # BLD AUTO: 259 CELLS/UL (ref 15–500)
EOSINOPHIL NFR BLD AUTO: 3.2 %
ERYTHROCYTE [DISTWIDTH] IN BLOOD BY AUTOMATED COUNT: 13.3 % (ref 11–15)
GLUCOSE SERPL-MCNC: 100 MG/DL (ref 65–139)
HCT VFR BLD AUTO: 45.3 % (ref 35–45)
HDLC SERPL-MCNC: 69 MG/DL
HGB BLD-MCNC: 14.5 G/DL (ref 11.7–15.5)
LDLC SERPL CALC-MCNC: 116 MG/DL (CALC)
LYMPHOCYTES # BLD AUTO: 1993 CELLS/UL (ref 850–3900)
LYMPHOCYTES NFR BLD AUTO: 24.6 %
MCH RBC QN AUTO: 30.5 PG (ref 27–33)
MCHC RBC AUTO-ENTMCNC: 32 G/DL (ref 32–36)
MCV RBC AUTO: 95.4 FL (ref 80–100)
MICROALBUMIN UR-MCNC: 0.2 MG/DL
MONOCYTES # BLD AUTO: 964 CELLS/UL (ref 200–950)
MONOCYTES NFR BLD AUTO: 11.9 %
NEUTROPHILS # BLD AUTO: 4787 CELLS/UL (ref 1500–7800)
NEUTROPHILS NFR BLD AUTO: 59.1 %
NONHDLC SERPL-MCNC: 135 MG/DL (CALC)
PLATELET # BLD AUTO: 238 THOUSAND/UL (ref 140–400)
PMV BLD REES-ECKER: 12.5 FL (ref 7.5–12.5)
POTASSIUM SERPL-SCNC: 4.2 MMOL/L (ref 3.5–5.3)
PROT SERPL-MCNC: 6.4 G/DL (ref 6.1–8.1)
RBC # BLD AUTO: 4.75 MILLION/UL (ref 3.8–5.1)
SODIUM SERPL-SCNC: 137 MMOL/L (ref 135–146)
TRIGL SERPL-MCNC: 87 MG/DL
WBC # BLD AUTO: 8.1 THOUSAND/UL (ref 3.8–10.8)

## 2025-07-17 LAB
ATRIAL RATE: 76 BPM
P OFFSET: 223 MS
P ONSET: 170 MS
PR INTERVAL: 118 MS
Q ONSET: 229 MS
QRS COUNT: 12 BEATS
QRS DURATION: 82 MS
QT INTERVAL: 408 MS
QTC CALCULATION(BAZETT): 459 MS
QTC FREDERICIA: 441 MS
R AXIS: -7 DEGREES
STAPHYLOCOCCUS SPEC CULT: NORMAL
T AXIS: -19 DEGREES
T OFFSET: 433 MS
VENTRICULAR RATE: 76 BPM

## 2025-07-18 ENCOUNTER — APPOINTMENT (OUTPATIENT)
Dept: PRIMARY CARE | Facility: CLINIC | Age: 80
End: 2025-07-18
Payer: MEDICARE

## 2025-07-18 ENCOUNTER — APPOINTMENT (OUTPATIENT)
Dept: OPHTHALMOLOGY | Facility: CLINIC | Age: 80
End: 2025-07-18
Payer: MEDICARE

## 2025-07-22 ENCOUNTER — OFFICE VISIT (OUTPATIENT)
Dept: PRIMARY CARE | Facility: CLINIC | Age: 80
End: 2025-07-22
Payer: MEDICARE

## 2025-07-22 VITALS
OXYGEN SATURATION: 96 % | BODY MASS INDEX: 35.61 KG/M2 | HEIGHT: 63 IN | WEIGHT: 201 LBS | DIASTOLIC BLOOD PRESSURE: 80 MMHG | SYSTOLIC BLOOD PRESSURE: 120 MMHG | HEART RATE: 74 BPM

## 2025-07-22 DIAGNOSIS — M19.012 PRIMARY OSTEOARTHRITIS OF LEFT SHOULDER: Primary | ICD-10-CM

## 2025-07-22 DIAGNOSIS — Z01.818 ENCOUNTER FOR PREOPERATIVE EXAMINATION FOR GENERAL SURGICAL PROCEDURE: ICD-10-CM

## 2025-07-22 PROCEDURE — 3074F SYST BP LT 130 MM HG: CPT | Performed by: STUDENT IN AN ORGANIZED HEALTH CARE EDUCATION/TRAINING PROGRAM

## 2025-07-22 PROCEDURE — 3079F DIAST BP 80-89 MM HG: CPT | Performed by: STUDENT IN AN ORGANIZED HEALTH CARE EDUCATION/TRAINING PROGRAM

## 2025-07-22 PROCEDURE — 1125F AMNT PAIN NOTED PAIN PRSNT: CPT | Performed by: STUDENT IN AN ORGANIZED HEALTH CARE EDUCATION/TRAINING PROGRAM

## 2025-07-22 PROCEDURE — 1159F MED LIST DOCD IN RCRD: CPT | Performed by: STUDENT IN AN ORGANIZED HEALTH CARE EDUCATION/TRAINING PROGRAM

## 2025-07-22 PROCEDURE — 99213 OFFICE O/P EST LOW 20 MIN: CPT | Performed by: STUDENT IN AN ORGANIZED HEALTH CARE EDUCATION/TRAINING PROGRAM

## 2025-07-22 ASSESSMENT — PAIN SCALES - GENERAL: PAINLEVEL_OUTOF10: 7

## 2025-07-22 ASSESSMENT — PATIENT HEALTH QUESTIONNAIRE - PHQ9
2. FEELING DOWN, DEPRESSED OR HOPELESS: NOT AT ALL
SUM OF ALL RESPONSES TO PHQ9 QUESTIONS 1 AND 2: 0
1. LITTLE INTEREST OR PLEASURE IN DOING THINGS: NOT AT ALL

## 2025-07-22 ASSESSMENT — ENCOUNTER SYMPTOMS
SHORTNESS OF BREATH: 0
COUGH: 0
WHEEZING: 0
ARTHRALGIAS: 1
PALPITATIONS: 0

## 2025-07-22 ASSESSMENT — COLUMBIA-SUICIDE SEVERITY RATING SCALE - C-SSRS: 1. IN THE PAST MONTH, HAVE YOU WISHED YOU WERE DEAD OR WISHED YOU COULD GO TO SLEEP AND NOT WAKE UP?: NO

## 2025-07-22 NOTE — PROGRESS NOTES
"Subjective   Patient ID: Sue Gar is a 80 y.o. female who presents for Pre-op Exam.    Here today for pre-op clearance for left reverse total shoulder.      Has had labs and EKG as requested.         Review of Systems   Respiratory:  Negative for cough, shortness of breath and wheezing.    Cardiovascular:  Negative for chest pain and palpitations.   Musculoskeletal:  Positive for arthralgias (left shoulder).   All other systems reviewed and are negative.      Objective     Visit Vitals  /80   Pulse 74   Ht 1.6 m (5' 3\")   Wt 91.2 kg (201 lb)   SpO2 96%   BMI 35.61 kg/m²   OB Status Postmenopausal   Smoking Status Every Day   BSA 2.01 m²         Physical Exam  Constitutional:       Appearance: Normal appearance.     Eyes:      Conjunctiva/sclera: Conjunctivae normal.       Cardiovascular:      Rate and Rhythm: Normal rate and regular rhythm.      Pulses: Normal pulses.      Heart sounds: Normal heart sounds.   Pulmonary:      Effort: Pulmonary effort is normal.      Breath sounds: Normal breath sounds.     Skin:     General: Skin is warm and dry.     Neurological:      Mental Status: She is alert.     Psychiatric:         Mood and Affect: Mood normal.         Behavior: Behavior normal.         Assessment & Plan  Primary osteoarthritis of left shoulder  RCRI is 0.   Reviewed EKG which showed sinus rhythm with PACs.   Reviewed notes from orthopedic surgery.    Patient is appropriate risk to proceed with planned procedure.  No objections to proceeding with surgery.       Encounter for preoperative examination for general surgical procedure            Reviewed and approved by RADHA BASS on 7/22/25 at 3:35 PM.       "

## 2025-07-22 NOTE — ASSESSMENT & PLAN NOTE
RCRI is 0.   Reviewed EKG which showed sinus rhythm with PACs.   Reviewed notes from orthopedic surgery.    Patient is appropriate risk to proceed with planned procedure.  No objections to proceeding with surgery.

## 2025-07-23 ENCOUNTER — ANESTHESIA EVENT (OUTPATIENT)
Dept: OPERATING ROOM | Facility: HOSPITAL | Age: 80
End: 2025-07-23
Payer: MEDICARE

## 2025-07-23 ENCOUNTER — APPOINTMENT (OUTPATIENT)
Dept: RADIOLOGY | Facility: HOSPITAL | Age: 80
End: 2025-07-23
Payer: MEDICARE

## 2025-07-23 ENCOUNTER — HOSPITAL ENCOUNTER (OUTPATIENT)
Facility: HOSPITAL | Age: 80
Discharge: HOME | End: 2025-07-24
Attending: ORTHOPAEDIC SURGERY | Admitting: ORTHOPAEDIC SURGERY
Payer: MEDICARE

## 2025-07-23 ENCOUNTER — ANESTHESIA (OUTPATIENT)
Dept: OPERATING ROOM | Facility: HOSPITAL | Age: 80
End: 2025-07-23
Payer: MEDICARE

## 2025-07-23 DIAGNOSIS — M19.012 OSTEOARTHRITIS OF LEFT SHOULDER, UNSPECIFIED OSTEOARTHRITIS TYPE: Primary | ICD-10-CM

## 2025-07-23 PROBLEM — Z96.612 STATUS POST REVERSE TOTAL SHOULDER REPLACEMENT, LEFT: Status: ACTIVE | Noted: 2025-07-23

## 2025-07-23 PROCEDURE — 64415 NJX AA&/STRD BRCH PLXS IMG: CPT

## 2025-07-23 PROCEDURE — A23472 PR RECONSTR TOTAL SHOULDER IMPLANT: Performed by: ANESTHESIOLOGY

## 2025-07-23 PROCEDURE — 2500000005 HC RX 250 GENERAL PHARMACY W/O HCPCS: Performed by: ORTHOPAEDIC SURGERY

## 2025-07-23 PROCEDURE — A23472 PR RECONSTR TOTAL SHOULDER IMPLANT: Performed by: NURSE ANESTHETIST, CERTIFIED REGISTERED

## 2025-07-23 PROCEDURE — 2500000004 HC RX 250 GENERAL PHARMACY W/ HCPCS (ALT 636 FOR OP/ED): Performed by: ORTHOPAEDIC SURGERY

## 2025-07-23 PROCEDURE — 2500000002 HC RX 250 W HCPCS SELF ADMINISTERED DRUGS (ALT 637 FOR MEDICARE OP, ALT 636 FOR OP/ED): Performed by: HOSPITALIST

## 2025-07-23 PROCEDURE — 23430 REPAIR BICEPS TENDON: CPT | Performed by: ORTHOPAEDIC SURGERY

## 2025-07-23 PROCEDURE — 23472 RECONSTRUCT SHOULDER JOINT: CPT | Performed by: ORTHOPAEDIC SURGERY

## 2025-07-23 PROCEDURE — 2500000004 HC RX 250 GENERAL PHARMACY W/ HCPCS (ALT 636 FOR OP/ED): Performed by: PHARMACIST

## 2025-07-23 PROCEDURE — 7100000011 HC EXTENDED STAY RECOVERY HOURLY - NURSING UNIT

## 2025-07-23 PROCEDURE — 94762 N-INVAS EAR/PLS OXIMTRY CONT: CPT | Mod: 59

## 2025-07-23 PROCEDURE — 2500000001 HC RX 250 WO HCPCS SELF ADMINISTERED DRUGS (ALT 637 FOR MEDICARE OP): Performed by: HOSPITALIST

## 2025-07-23 PROCEDURE — 3700000002 HC GENERAL ANESTHESIA TIME - EACH INCREMENTAL 1 MINUTE: Performed by: ORTHOPAEDIC SURGERY

## 2025-07-23 PROCEDURE — C1713 ANCHOR/SCREW BN/BN,TIS/BN: HCPCS | Performed by: ORTHOPAEDIC SURGERY

## 2025-07-23 PROCEDURE — 2500000001 HC RX 250 WO HCPCS SELF ADMINISTERED DRUGS (ALT 637 FOR MEDICARE OP): Performed by: PHYSICIAN ASSISTANT

## 2025-07-23 PROCEDURE — 23472 RECONSTRUCT SHOULDER JOINT: CPT | Performed by: PHYSICIAN ASSISTANT

## 2025-07-23 PROCEDURE — 73020 X-RAY EXAM OF SHOULDER: CPT | Mod: LEFT SIDE | Performed by: RADIOLOGY

## 2025-07-23 PROCEDURE — 2500000004 HC RX 250 GENERAL PHARMACY W/ HCPCS (ALT 636 FOR OP/ED): Performed by: NURSE ANESTHETIST, CERTIFIED REGISTERED

## 2025-07-23 PROCEDURE — 3700000001 HC GENERAL ANESTHESIA TIME - INITIAL BASE CHARGE: Performed by: ORTHOPAEDIC SURGERY

## 2025-07-23 PROCEDURE — 3600000010 HC OR TIME - EACH INCREMENTAL 1 MINUTE - PROCEDURE LEVEL FIVE: Performed by: ORTHOPAEDIC SURGERY

## 2025-07-23 PROCEDURE — 7100000001 HC RECOVERY ROOM TIME - INITIAL BASE CHARGE: Performed by: ORTHOPAEDIC SURGERY

## 2025-07-23 PROCEDURE — C1776 JOINT DEVICE (IMPLANTABLE): HCPCS | Performed by: ORTHOPAEDIC SURGERY

## 2025-07-23 PROCEDURE — 99100 ANES PT EXTEME AGE<1 YR&>70: CPT | Performed by: ANESTHESIOLOGY

## 2025-07-23 PROCEDURE — 2500000005 HC RX 250 GENERAL PHARMACY W/O HCPCS: Performed by: NURSE ANESTHETIST, CERTIFIED REGISTERED

## 2025-07-23 PROCEDURE — A4649 SURGICAL SUPPLIES: HCPCS | Performed by: ORTHOPAEDIC SURGERY

## 2025-07-23 PROCEDURE — 73020 X-RAY EXAM OF SHOULDER: CPT | Mod: LT

## 2025-07-23 PROCEDURE — 99222 1ST HOSP IP/OBS MODERATE 55: CPT | Performed by: HOSPITALIST

## 2025-07-23 PROCEDURE — 2500000004 HC RX 250 GENERAL PHARMACY W/ HCPCS (ALT 636 FOR OP/ED): Performed by: PHYSICIAN ASSISTANT

## 2025-07-23 PROCEDURE — 3600000005 HC OR TIME - INITIAL BASE CHARGE - PROCEDURE LEVEL FIVE: Performed by: ORTHOPAEDIC SURGERY

## 2025-07-23 PROCEDURE — 2500000004 HC RX 250 GENERAL PHARMACY W/ HCPCS (ALT 636 FOR OP/ED)

## 2025-07-23 PROCEDURE — 2500000004 HC RX 250 GENERAL PHARMACY W/ HCPCS (ALT 636 FOR OP/ED): Performed by: ANESTHESIOLOGY

## 2025-07-23 PROCEDURE — 7100000002 HC RECOVERY ROOM TIME - EACH INCREMENTAL 1 MINUTE: Performed by: ORTHOPAEDIC SURGERY

## 2025-07-23 PROCEDURE — 2720000007 HC OR 272 NO HCPCS: Performed by: ORTHOPAEDIC SURGERY

## 2025-07-23 PROCEDURE — 9420000001 HC RT PATIENT EDUCATION 5 MIN

## 2025-07-23 PROCEDURE — 23430 REPAIR BICEPS TENDON: CPT | Performed by: PHYSICIAN ASSISTANT

## 2025-07-23 PROCEDURE — 2780000003 HC OR 278 NO HCPCS: Performed by: ORTHOPAEDIC SURGERY

## 2025-07-23 DEVICE — 5.5X16MM PERIPHERAL SCREW, LOCKING
Type: IMPLANTABLE DEVICE | Site: SHOULDER | Status: FUNCTIONAL
Brand: ARTHREX®

## 2025-07-23 DEVICE — POST, MODULAR, 25MM: Type: IMPLANTABLE DEVICE | Site: SHOULDER | Status: FUNCTIONAL

## 2025-07-23 DEVICE — SCREW, NON-LOCKING, 4.5MM X 24MM: Type: IMPLANTABLE DEVICE | Site: SHOULDER | Status: FUNCTIONAL

## 2025-07-23 DEVICE — UNIVERS REVERS SUTURE CUP, 33 (NEUTRAL)
Type: IMPLANTABLE DEVICE | Site: SHOULDER | Status: FUNCTIONAL
Brand: ARTHREX®

## 2025-07-23 DEVICE — DYNANITE VIP GLENOID PIN, NITINOL, 2.8MM
Type: IMPLANTABLE DEVICE | Site: SHOULDER | Status: FUNCTIONAL
Brand: ARTHREX®

## 2025-07-23 DEVICE — GLENOSPHERE, 36/24 BASEPLATE TAPER: Type: IMPLANTABLE DEVICE | Site: SHOULDER | Status: FUNCTIONAL

## 2025-07-23 DEVICE — BASEPLATE, 24MM, 10 DEG, FULL AUGMENT, +2 LAT: Type: IMPLANTABLE DEVICE | Site: SHOULDER | Status: FUNCTIONAL

## 2025-07-23 DEVICE — 5.5X20MM PERIPHERAL SCREW, LOCKING
Type: IMPLANTABLE DEVICE | Site: SHOULDER | Status: FUNCTIONAL
Brand: ARTHREX®

## 2025-07-23 DEVICE — UNIVERS REVERS HUMERAL STEM, SIZE 7
Type: IMPLANTABLE DEVICE | Site: SHOULDER | Status: FUNCTIONAL
Brand: ARTHREX®

## 2025-07-23 RX ORDER — MEPERIDINE HYDROCHLORIDE 25 MG/ML
12.5 INJECTION INTRAMUSCULAR; INTRAVENOUS; SUBCUTANEOUS EVERY 10 MIN PRN
Status: DISCONTINUED | OUTPATIENT
Start: 2025-07-23 | End: 2025-07-23 | Stop reason: HOSPADM

## 2025-07-23 RX ORDER — POLYETHYLENE GLYCOL 3350 17 G/17G
17 POWDER, FOR SOLUTION ORAL DAILY
Status: DISCONTINUED | OUTPATIENT
Start: 2025-07-24 | End: 2025-07-24 | Stop reason: HOSPADM

## 2025-07-23 RX ORDER — MIDAZOLAM HYDROCHLORIDE 2 MG/2ML
2 INJECTION, SOLUTION INTRAMUSCULAR; INTRAVENOUS ONCE
Status: DISCONTINUED | OUTPATIENT
Start: 2025-07-23 | End: 2025-07-23

## 2025-07-23 RX ORDER — ONDANSETRON HYDROCHLORIDE 2 MG/ML
4 INJECTION, SOLUTION INTRAVENOUS EVERY 8 HOURS PRN
Status: DISCONTINUED | OUTPATIENT
Start: 2025-07-23 | End: 2025-07-24 | Stop reason: HOSPADM

## 2025-07-23 RX ORDER — DOCUSATE SODIUM 50 MG/5ML
100 LIQUID ORAL 2 TIMES DAILY
Status: DISCONTINUED | OUTPATIENT
Start: 2025-07-23 | End: 2025-07-24 | Stop reason: HOSPADM

## 2025-07-23 RX ORDER — ONDANSETRON HYDROCHLORIDE 2 MG/ML
INJECTION, SOLUTION INTRAVENOUS AS NEEDED
Status: DISCONTINUED | OUTPATIENT
Start: 2025-07-23 | End: 2025-07-23

## 2025-07-23 RX ORDER — FENTANYL CITRATE 50 UG/ML
50 INJECTION, SOLUTION INTRAMUSCULAR; INTRAVENOUS EVERY 5 MIN PRN
Status: DISCONTINUED | OUTPATIENT
Start: 2025-07-23 | End: 2025-07-23 | Stop reason: HOSPADM

## 2025-07-23 RX ORDER — DIPHENHYDRAMINE HYDROCHLORIDE 50 MG/ML
12.5 INJECTION, SOLUTION INTRAMUSCULAR; INTRAVENOUS ONCE AS NEEDED
Status: DISCONTINUED | OUTPATIENT
Start: 2025-07-23 | End: 2025-07-23 | Stop reason: HOSPADM

## 2025-07-23 RX ORDER — NAPROXEN 250 MG/1
250 TABLET ORAL
COMMUNITY

## 2025-07-23 RX ORDER — LOSARTAN POTASSIUM 25 MG/1
50 TABLET ORAL DAILY
Status: DISCONTINUED | OUTPATIENT
Start: 2025-07-23 | End: 2025-07-24 | Stop reason: HOSPADM

## 2025-07-23 RX ORDER — FENTANYL CITRATE 50 UG/ML
50 INJECTION, SOLUTION INTRAMUSCULAR; INTRAVENOUS ONCE
Refills: 0 | Status: COMPLETED | OUTPATIENT
Start: 2025-07-23 | End: 2025-07-23

## 2025-07-23 RX ORDER — HYDROCHLOROTHIAZIDE 25 MG/1
12.5 TABLET ORAL DAILY
Status: DISCONTINUED | OUTPATIENT
Start: 2025-07-23 | End: 2025-07-24 | Stop reason: HOSPADM

## 2025-07-23 RX ORDER — HYDROCODONE BITARTRATE AND ACETAMINOPHEN 5; 325 MG/1; MG/1
1 TABLET ORAL EVERY 6 HOURS PRN
Refills: 0 | Status: DISCONTINUED | OUTPATIENT
Start: 2025-07-23 | End: 2025-07-24 | Stop reason: HOSPADM

## 2025-07-23 RX ORDER — TRANEXAMIC ACID 1 G/10ML
INJECTION, SOLUTION INTRAVENOUS AS NEEDED
Status: DISCONTINUED | OUTPATIENT
Start: 2025-07-23 | End: 2025-07-23 | Stop reason: HOSPADM

## 2025-07-23 RX ORDER — CLINDAMYCIN PHOSPHATE 600 MG/50ML
600 INJECTION, SOLUTION INTRAVENOUS EVERY 8 HOURS
Status: COMPLETED | OUTPATIENT
Start: 2025-07-23 | End: 2025-07-24

## 2025-07-23 RX ORDER — ONDANSETRON HYDROCHLORIDE 2 MG/ML
4 INJECTION, SOLUTION INTRAVENOUS ONCE AS NEEDED
Status: DISCONTINUED | OUTPATIENT
Start: 2025-07-23 | End: 2025-07-23 | Stop reason: HOSPADM

## 2025-07-23 RX ORDER — SODIUM CHLORIDE, SODIUM LACTATE, POTASSIUM CHLORIDE, CALCIUM CHLORIDE 600; 310; 30; 20 MG/100ML; MG/100ML; MG/100ML; MG/100ML
100 INJECTION, SOLUTION INTRAVENOUS CONTINUOUS
Status: DISCONTINUED | OUTPATIENT
Start: 2025-07-23 | End: 2025-07-24

## 2025-07-23 RX ORDER — ONDANSETRON 4 MG/1
4 TABLET, ORALLY DISINTEGRATING ORAL EVERY 8 HOURS PRN
Status: DISCONTINUED | OUTPATIENT
Start: 2025-07-23 | End: 2025-07-24 | Stop reason: HOSPADM

## 2025-07-23 RX ORDER — PRAVASTATIN SODIUM 20 MG/1
40 TABLET ORAL DAILY
Status: DISCONTINUED | OUTPATIENT
Start: 2025-07-23 | End: 2025-07-24 | Stop reason: HOSPADM

## 2025-07-23 RX ORDER — MIDAZOLAM HYDROCHLORIDE 1 MG/ML
2 INJECTION, SOLUTION INTRAMUSCULAR; INTRAVENOUS ONCE
Status: COMPLETED | OUTPATIENT
Start: 2025-07-23 | End: 2025-07-23

## 2025-07-23 RX ORDER — HYDRALAZINE HYDROCHLORIDE 20 MG/ML
5 INJECTION INTRAMUSCULAR; INTRAVENOUS EVERY 30 MIN PRN
Status: DISCONTINUED | OUTPATIENT
Start: 2025-07-23 | End: 2025-07-23 | Stop reason: HOSPADM

## 2025-07-23 RX ORDER — CHOLECALCIFEROL (VITAMIN D3) 25 MCG
25 TABLET ORAL DAILY
Status: DISCONTINUED | OUTPATIENT
Start: 2025-07-23 | End: 2025-07-24 | Stop reason: HOSPADM

## 2025-07-23 RX ORDER — CLINDAMYCIN PHOSPHATE 900 MG/50ML
900 INJECTION, SOLUTION INTRAVENOUS ONCE
Status: COMPLETED | OUTPATIENT
Start: 2025-07-23 | End: 2025-07-23

## 2025-07-23 RX ORDER — PROPOFOL 10 MG/ML
INJECTION, EMULSION INTRAVENOUS AS NEEDED
Status: DISCONTINUED | OUTPATIENT
Start: 2025-07-23 | End: 2025-07-23

## 2025-07-23 RX ORDER — ROPIVACAINE HYDROCHLORIDE 5 MG/ML
INJECTION, SOLUTION EPIDURAL; INFILTRATION; PERINEURAL
Status: COMPLETED | OUTPATIENT
Start: 2025-07-23 | End: 2025-07-23

## 2025-07-23 RX ORDER — NORETHINDRONE AND ETHINYL ESTRADIOL 0.5-0.035
KIT ORAL AS NEEDED
Status: DISCONTINUED | OUTPATIENT
Start: 2025-07-23 | End: 2025-07-23

## 2025-07-23 RX ORDER — FENTANYL CITRATE 50 UG/ML
25 INJECTION, SOLUTION INTRAMUSCULAR; INTRAVENOUS EVERY 5 MIN PRN
Status: DISCONTINUED | OUTPATIENT
Start: 2025-07-23 | End: 2025-07-23 | Stop reason: HOSPADM

## 2025-07-23 RX ORDER — VANCOMYCIN HYDROCHLORIDE 1 G/20ML
INJECTION, POWDER, LYOPHILIZED, FOR SOLUTION INTRAVENOUS AS NEEDED
Status: DISCONTINUED | OUTPATIENT
Start: 2025-07-23 | End: 2025-07-23 | Stop reason: HOSPADM

## 2025-07-23 RX ORDER — ALBUTEROL SULFATE 0.83 MG/ML
2.5 SOLUTION RESPIRATORY (INHALATION) ONCE AS NEEDED
Status: DISCONTINUED | OUTPATIENT
Start: 2025-07-23 | End: 2025-07-23 | Stop reason: HOSPADM

## 2025-07-23 RX ORDER — SERTRALINE HYDROCHLORIDE 50 MG/1
50 TABLET, FILM COATED ORAL DAILY
Status: DISCONTINUED | OUTPATIENT
Start: 2025-07-23 | End: 2025-07-24 | Stop reason: HOSPADM

## 2025-07-23 RX ORDER — VANCOMYCIN HYDROCHLORIDE 1 G/20ML
INJECTION, POWDER, LYOPHILIZED, FOR SOLUTION INTRAVENOUS AS NEEDED
Status: DISCONTINUED | OUTPATIENT
Start: 2025-07-23 | End: 2025-07-23

## 2025-07-23 RX ORDER — ROCURONIUM BROMIDE 10 MG/ML
INJECTION, SOLUTION INTRAVENOUS AS NEEDED
Status: DISCONTINUED | OUTPATIENT
Start: 2025-07-23 | End: 2025-07-23

## 2025-07-23 RX ADMIN — CLINDAMYCIN PHOSPHATE 900 MG: 900 INJECTION, SOLUTION INTRAVENOUS at 11:31

## 2025-07-23 RX ADMIN — NORETHINDRONE AND ETHINYL ESTRADIOL 25 MG: KIT ORAL at 12:19

## 2025-07-23 RX ADMIN — FENTANYL CITRATE 50 MCG: 50 INJECTION INTRAMUSCULAR; INTRAVENOUS at 10:19

## 2025-07-23 RX ADMIN — PROPOFOL 200 MG: 10 INJECTION, EMULSION INTRAVENOUS at 11:25

## 2025-07-23 RX ADMIN — NORETHINDRONE AND ETHINYL ESTRADIOL 25 MG: KIT ORAL at 11:56

## 2025-07-23 RX ADMIN — DEXAMETHASONE SODIUM PHOSPHATE 4 MG: 4 INJECTION, SOLUTION INTRAMUSCULAR; INTRAVENOUS at 11:35

## 2025-07-23 RX ADMIN — SODIUM CHLORIDE, SODIUM LACTATE, POTASSIUM CHLORIDE, AND CALCIUM CHLORIDE 100 ML/HR: 600; 310; 30; 20 INJECTION, SOLUTION INTRAVENOUS at 15:34

## 2025-07-23 RX ADMIN — HYDROCODONE BITARTRATE AND ACETAMINOPHEN 1 TABLET: 5; 325 TABLET ORAL at 22:09

## 2025-07-23 RX ADMIN — Medication 25 MCG: at 15:35

## 2025-07-23 RX ADMIN — CLINDAMYCIN PHOSPHATE 600 MG: 600 INJECTION, SOLUTION INTRAVENOUS at 20:15

## 2025-07-23 RX ADMIN — DOCUSATE SODIUM 100 MG: 50 LIQUID ORAL at 20:15

## 2025-07-23 RX ADMIN — SERTRALINE 50 MG: 50 TABLET, FILM COATED ORAL at 15:34

## 2025-07-23 RX ADMIN — SUGAMMADEX 200 MG: 100 INJECTION, SOLUTION INTRAVENOUS at 12:35

## 2025-07-23 RX ADMIN — ROCURONIUM BROMIDE 40 MG: 10 INJECTION INTRAVENOUS at 11:26

## 2025-07-23 RX ADMIN — MIDAZOLAM 2 MG: 1 INJECTION INTRAMUSCULAR; INTRAVENOUS at 10:19

## 2025-07-23 RX ADMIN — LOSARTAN POTASSIUM 50 MG: 25 TABLET, FILM COATED ORAL at 15:34

## 2025-07-23 RX ADMIN — VANCOMYCIN HYDROCHLORIDE 1 G: 1 INJECTION, POWDER, LYOPHILIZED, FOR SOLUTION INTRAVENOUS at 11:32

## 2025-07-23 RX ADMIN — DEXAMETHASONE SODIUM PHOSPHATE 5 MG: 4 INJECTION, SOLUTION INTRAMUSCULAR; INTRAVENOUS at 10:24

## 2025-07-23 RX ADMIN — HYDROCHLOROTHIAZIDE 12.5 MG: 25 TABLET ORAL at 15:34

## 2025-07-23 RX ADMIN — Medication: at 23:32

## 2025-07-23 RX ADMIN — PRAVASTATIN SODIUM 40 MG: 20 TABLET ORAL at 15:34

## 2025-07-23 RX ADMIN — ROPIVACAINE HYDROCHLORIDE 20 ML: 5 INJECTION, SOLUTION EPIDURAL; INFILTRATION; PERINEURAL at 10:24

## 2025-07-23 RX ADMIN — ONDANSETRON 4 MG: 2 INJECTION, SOLUTION INTRAMUSCULAR; INTRAVENOUS at 11:35

## 2025-07-23 SDOH — ECONOMIC STABILITY: FOOD INSECURITY: WITHIN THE PAST 12 MONTHS, YOU WORRIED THAT YOUR FOOD WOULD RUN OUT BEFORE YOU GOT THE MONEY TO BUY MORE.: NEVER TRUE

## 2025-07-23 SDOH — HEALTH STABILITY: PHYSICAL HEALTH
HOW OFTEN DO YOU NEED TO HAVE SOMEONE HELP YOU WHEN YOU READ INSTRUCTIONS, PAMPHLETS, OR OTHER WRITTEN MATERIAL FROM YOUR DOCTOR OR PHARMACY?: RARELY

## 2025-07-23 SDOH — SOCIAL STABILITY: SOCIAL NETWORK: HOW OFTEN DO YOU GET TOGETHER WITH FRIENDS OR RELATIVES?: ONCE A WEEK

## 2025-07-23 SDOH — SOCIAL STABILITY: SOCIAL INSECURITY: ARE YOU MARRIED, WIDOWED, DIVORCED, SEPARATED, NEVER MARRIED, OR LIVING WITH A PARTNER?: MARRIED

## 2025-07-23 SDOH — ECONOMIC STABILITY: INCOME INSECURITY: IN THE PAST 12 MONTHS HAS THE ELECTRIC, GAS, OIL, OR WATER COMPANY THREATENED TO SHUT OFF SERVICES IN YOUR HOME?: NO

## 2025-07-23 SDOH — SOCIAL STABILITY: SOCIAL INSECURITY: DO YOU FEEL UNSAFE GOING BACK TO THE PLACE WHERE YOU ARE LIVING?: NO

## 2025-07-23 SDOH — SOCIAL STABILITY: SOCIAL INSECURITY
ASK PARENT OR GUARDIAN: ARE THERE TIMES WHEN YOU, YOUR CHILD(REN), OR ANY MEMBER OF YOUR HOUSEHOLD FEEL UNSAFE, HARMED, OR THREATENED AROUND PERSONS WITH WHOM YOU KNOW OR LIVE?: NO

## 2025-07-23 SDOH — SOCIAL STABILITY: SOCIAL INSECURITY
WITHIN THE LAST YEAR, HAVE YOU BEEN RAPED OR FORCED TO HAVE ANY KIND OF SEXUAL ACTIVITY BY YOUR PARTNER OR EX-PARTNER?: NO

## 2025-07-23 SDOH — ECONOMIC STABILITY: HOUSING INSECURITY: AT ANY TIME IN THE PAST 12 MONTHS, WERE YOU HOMELESS OR LIVING IN A SHELTER (INCLUDING NOW)?: NO

## 2025-07-23 SDOH — SOCIAL STABILITY: SOCIAL INSECURITY: HAS ANYONE EVER THREATENED TO HURT YOUR FAMILY OR YOUR PETS?: NO

## 2025-07-23 SDOH — HEALTH STABILITY: PHYSICAL HEALTH: ON AVERAGE, HOW MANY DAYS PER WEEK DO YOU ENGAGE IN MODERATE TO STRENUOUS EXERCISE (LIKE A BRISK WALK)?: 0 DAYS

## 2025-07-23 SDOH — ECONOMIC STABILITY: FOOD INSECURITY: HOW HARD IS IT FOR YOU TO PAY FOR THE VERY BASICS LIKE FOOD, HOUSING, MEDICAL CARE, AND HEATING?: NOT VERY HARD

## 2025-07-23 SDOH — ECONOMIC STABILITY: HOUSING INSECURITY: IN THE LAST 12 MONTHS, WAS THERE A TIME WHEN YOU WERE NOT ABLE TO PAY THE MORTGAGE OR RENT ON TIME?: NO

## 2025-07-23 SDOH — HEALTH STABILITY: MENTAL HEALTH
DO YOU FEEL STRESS - TENSE, RESTLESS, NERVOUS, OR ANXIOUS, OR UNABLE TO SLEEP AT NIGHT BECAUSE YOUR MIND IS TROUBLED ALL THE TIME - THESE DAYS?: NOT AT ALL

## 2025-07-23 SDOH — HEALTH STABILITY: PHYSICAL HEALTH: ON AVERAGE, HOW MANY MINUTES DO YOU ENGAGE IN EXERCISE AT THIS LEVEL?: 0 MIN

## 2025-07-23 SDOH — ECONOMIC STABILITY: HOUSING INSECURITY: DO YOU FEEL UNSAFE GOING BACK TO THE PLACE WHERE YOU LIVE?: NO

## 2025-07-23 SDOH — ECONOMIC STABILITY: FOOD INSECURITY: WITHIN THE PAST 12 MONTHS, THE FOOD YOU BOUGHT JUST DIDN'T LAST AND YOU DIDN'T HAVE MONEY TO GET MORE.: NEVER TRUE

## 2025-07-23 SDOH — SOCIAL STABILITY: SOCIAL INSECURITY
WITHIN THE LAST YEAR, HAVE YOU BEEN KICKED, HIT, SLAPPED, OR OTHERWISE PHYSICALLY HURT BY YOUR PARTNER OR EX-PARTNER?: NO

## 2025-07-23 SDOH — SOCIAL STABILITY: SOCIAL INSECURITY: HAVE YOU HAD THOUGHTS OF HARMING ANYONE ELSE?: NO

## 2025-07-23 SDOH — ECONOMIC STABILITY: TRANSPORTATION INSECURITY: IN THE PAST 12 MONTHS, HAS LACK OF TRANSPORTATION KEPT YOU FROM MEDICAL APPOINTMENTS OR FROM GETTING MEDICATIONS?: NO

## 2025-07-23 SDOH — SOCIAL STABILITY: SOCIAL NETWORK: HOW OFTEN DO YOU ATTEND MEETINGS OF THE CLUBS OR ORGANIZATIONS YOU BELONG TO?: 1 TO 4 TIMES PER YEAR

## 2025-07-23 SDOH — SOCIAL STABILITY: SOCIAL NETWORK: HOW OFTEN DO YOU ATTEND CHURCH OR RELIGIOUS SERVICES?: 1 TO 4 TIMES PER YEAR

## 2025-07-23 SDOH — SOCIAL STABILITY: SOCIAL INSECURITY: WERE YOU ABLE TO COMPLETE ALL THE BEHAVIORAL HEALTH SCREENINGS?: YES

## 2025-07-23 SDOH — SOCIAL STABILITY: SOCIAL NETWORK
DO YOU BELONG TO ANY CLUBS OR ORGANIZATIONS SUCH AS CHURCH GROUPS, UNIONS, FRATERNAL OR ATHLETIC GROUPS, OR SCHOOL GROUPS?: NO

## 2025-07-23 SDOH — SOCIAL STABILITY: SOCIAL INSECURITY: WITHIN THE LAST YEAR, HAVE YOU BEEN AFRAID OF YOUR PARTNER OR EX-PARTNER?: NO

## 2025-07-23 SDOH — SOCIAL STABILITY: SOCIAL INSECURITY: WITHIN THE LAST YEAR, HAVE YOU BEEN HUMILIATED OR EMOTIONALLY ABUSED IN OTHER WAYS BY YOUR PARTNER OR EX-PARTNER?: NO

## 2025-07-23 SDOH — SOCIAL STABILITY: SOCIAL NETWORK: IN A TYPICAL WEEK, HOW MANY TIMES DO YOU TALK ON THE PHONE WITH FAMILY, FRIENDS, OR NEIGHBORS?: THREE TIMES A WEEK

## 2025-07-23 SDOH — ECONOMIC STABILITY: HOUSING INSECURITY: IN THE PAST 12 MONTHS, HOW MANY TIMES HAVE YOU MOVED WHERE YOU WERE LIVING?: 0

## 2025-07-23 SDOH — SOCIAL STABILITY: SOCIAL INSECURITY: HAVE YOU HAD ANY THOUGHTS OF HARMING ANYONE ELSE?: NO

## 2025-07-23 SDOH — SOCIAL STABILITY: SOCIAL INSECURITY: ABUSE: ADULT

## 2025-07-23 SDOH — SOCIAL STABILITY: SOCIAL INSECURITY: DOES ANYONE TRY TO KEEP YOU FROM HAVING/CONTACTING OTHER FRIENDS OR DOING THINGS OUTSIDE YOUR HOME?: NO

## 2025-07-23 SDOH — SOCIAL STABILITY: SOCIAL INSECURITY: DO YOU FEEL ANYONE HAS EXPLOITED OR TAKEN ADVANTAGE OF YOU FINANCIALLY OR OF YOUR PERSONAL PROPERTY?: NO

## 2025-07-23 SDOH — HEALTH STABILITY: MENTAL HEALTH: CURRENT SMOKER: 0

## 2025-07-23 SDOH — SOCIAL STABILITY: SOCIAL INSECURITY: ARE THERE ANY APPARENT SIGNS OF INJURIES/BEHAVIORS THAT COULD BE RELATED TO ABUSE/NEGLECT?: NO

## 2025-07-23 SDOH — SOCIAL STABILITY: SOCIAL INSECURITY: ARE YOU OR HAVE YOU BEEN THREATENED OR ABUSED PHYSICALLY, EMOTIONALLY, OR SEXUALLY BY ANYONE?: NO

## 2025-07-23 ASSESSMENT — PAIN - FUNCTIONAL ASSESSMENT
PAIN_FUNCTIONAL_ASSESSMENT: 0-10
PAIN_FUNCTIONAL_ASSESSMENT: UNABLE TO SELF-REPORT
PAIN_FUNCTIONAL_ASSESSMENT: 0-10

## 2025-07-23 ASSESSMENT — COGNITIVE AND FUNCTIONAL STATUS - GENERAL
HELP NEEDED FOR BATHING: A LITTLE
WALKING IN HOSPITAL ROOM: A LITTLE
DRESSING REGULAR UPPER BODY CLOTHING: A LITTLE
PATIENT BASELINE BEDBOUND: NO
MOBILITY SCORE: 18
STANDING UP FROM CHAIR USING ARMS: A LITTLE
DRESSING REGULAR LOWER BODY CLOTHING: A LITTLE
CLIMB 3 TO 5 STEPS WITH RAILING: A LITTLE
TURNING FROM BACK TO SIDE WHILE IN FLAT BAD: A LITTLE
MOVING FROM LYING ON BACK TO SITTING ON SIDE OF FLAT BED WITH BEDRAILS: A LITTLE
MOVING TO AND FROM BED TO CHAIR: A LITTLE
TOILETING: A LITTLE
DAILY ACTIVITIY SCORE: 20

## 2025-07-23 ASSESSMENT — LIFESTYLE VARIABLES
SUBSTANCE_ABUSE_PAST_12_MONTHS: NO
SKIP TO QUESTIONS 9-10: 1
AUDIT-C TOTAL SCORE: 0
HOW MANY STANDARD DRINKS CONTAINING ALCOHOL DO YOU HAVE ON A TYPICAL DAY: PATIENT DOES NOT DRINK
HOW OFTEN DO YOU HAVE A DRINK CONTAINING ALCOHOL: NEVER
HOW OFTEN DO YOU HAVE 6 OR MORE DRINKS ON ONE OCCASION: NEVER
PRESCIPTION_ABUSE_PAST_12_MONTHS: NO
AUDIT-C TOTAL SCORE: 0

## 2025-07-23 ASSESSMENT — PAIN SCALES - GENERAL
PAINLEVEL_OUTOF10: 7
PAINLEVEL_OUTOF10: 0 - NO PAIN
PAINLEVEL_OUTOF10: 7
PAINLEVEL_OUTOF10: 7
PAIN_LEVEL: 0
PAINLEVEL_OUTOF10: 5 - MODERATE PAIN

## 2025-07-23 ASSESSMENT — PAIN DESCRIPTION - ORIENTATION: ORIENTATION: LEFT

## 2025-07-23 ASSESSMENT — ACTIVITIES OF DAILY LIVING (ADL)
BATHING: INDEPENDENT
FEEDING YOURSELF: INDEPENDENT
HEARING - RIGHT EAR: HEARING AID
ASSISTIVE_DEVICE: DENTURES PARTIAL
TOILETING: INDEPENDENT
LACK_OF_TRANSPORTATION: NO
JUDGMENT_ADEQUATE_SAFELY_COMPLETE_DAILY_ACTIVITIES: YES
ADEQUATE_TO_COMPLETE_ADL: YES
DRESSING YOURSELF: INDEPENDENT
HEARING - LEFT EAR: HEARING AID
LACK_OF_TRANSPORTATION: NO
PATIENT'S MEMORY ADEQUATE TO SAFELY COMPLETE DAILY ACTIVITIES?: YES
GROOMING: INDEPENDENT
WALKS IN HOME: INDEPENDENT

## 2025-07-23 ASSESSMENT — PAIN DESCRIPTION - DESCRIPTORS
DESCRIPTORS: ACHING;SORE
DESCRIPTORS: ACHING

## 2025-07-23 ASSESSMENT — COLUMBIA-SUICIDE SEVERITY RATING SCALE - C-SSRS
6. HAVE YOU EVER DONE ANYTHING, STARTED TO DO ANYTHING, OR PREPARED TO DO ANYTHING TO END YOUR LIFE?: NO
1. IN THE PAST MONTH, HAVE YOU WISHED YOU WERE DEAD OR WISHED YOU COULD GO TO SLEEP AND NOT WAKE UP?: NO
2. HAVE YOU ACTUALLY HAD ANY THOUGHTS OF KILLING YOURSELF?: NO

## 2025-07-23 ASSESSMENT — PATIENT HEALTH QUESTIONNAIRE - PHQ9
2. FEELING DOWN, DEPRESSED OR HOPELESS: NOT AT ALL
SUM OF ALL RESPONSES TO PHQ9 QUESTIONS 1 & 2: 0
1. LITTLE INTEREST OR PLEASURE IN DOING THINGS: NOT AT ALL

## 2025-07-23 ASSESSMENT — PAIN DESCRIPTION - LOCATION: LOCATION: SHOULDER

## 2025-07-23 NOTE — PROGRESS NOTES
80 yr old female admitted extended recovery following left Arthrex reverse total shoulder replacement with Dr. Phoenix.  PT to [facility]. [facility] anticipates home with no skilled needs.  Post follow up within 2-4 weeks.      07/23/25 1542   Discharge Planning   Living Arrangements Spouse/significant other   Support Systems Spouse/significant other   Assistance Needed transportation   Type of Residence Private residence   Number of Stairs to Enter Residence 3   Number of Stairs Within Residence 0   Do you have animals or pets at home? No   Who is requesting discharge planning? Patient   Home or Post Acute Services None   Expected Discharge Disposition Home   Financial Resource Strain   How hard is it for you to pay for the very basics like food, housing, medical care, and heating? Not very   Housing Stability   In the last 12 months, was there a time when you were not able to pay the mortgage or rent on time? N   In the past 12 months, how many times have you moved where you were living? 0   At any time in the past 12 months, were you homeless or living in a shelter (including now)? N   Transportation Needs   In the past 12 months, has lack of transportation kept you from medical appointments or from getting medications? no   In the past 12 months, has lack of transportation kept you from meetings, work, or from getting things needed for daily living? No   Patient Choice   Provider Choice list and CMS website (https://medicare.gov/care-compare#search) for post-acute Quality and Resource Measure Data were provided and reviewed with: Patient   Patient / Family choosing to utilize agency / facility established prior to hospitalization No   Stroke Family Assessment   Stroke Family Assessment Needed No

## 2025-07-23 NOTE — CONSULTS
Medical Group Internal Medicine Consultation Note.    Patient's Name: Sue Gar  YOB: 1945  MR Number: [id number]  Date of Service: 07/23/25    Reason for medical consult: Postoperative medical management.    History of present illness:  This is a 80 years old female patient with past medical history as mentioned below underwent elective total shoulder hip reverse replacement/arthroplasty and I am seeing this patient for postoperative medical management.  At this time, patient is awake, alert and oriented x 3.  Patient's  was at the bedside.  Patient denied any pain, no left shoulder pain.  Denied any chest pain or shortness of breath.  Denied abdominal pain, nausea or vomiting.  Denied fever or chills.  She is afebrile, heart rate stable, blood pressure stable, on room air.    Medical History[1]  Surgical History[2]  Social Connections: Not on file     Current Medications:   Current Medications[3]    Review of systems:  Constitutional:  Negative for chills, diaphoresis, fatigue and fever.   HENT:  Negative for congestion, ear discharge, ear pain, hearing loss, rhinorrhea and sneezing.    Eyes:  Negative for pain, discharge and itching.   Respiratory: Negative for cough, chest tightness, shortness of breath. Negative for apnea, choking, wheezing and stridor.    Cardiovascular:  Negative for chest pain, palpitations and leg swelling.   Gastrointestinal:  Negative for abdominal distention, abdominal pain, blood in stool, constipation, diarrhea and nausea.   Endocrine: Negative for cold intolerance, heat intolerance and polydipsia.   Genitourinary:  Negative for difficulty urinating, dysuria, flank pain, frequency and hematuria.   Musculoskeletal:  Negative for arthralgias, back pain  and gait problem.   Neurological:  Negative for dizziness, seizures, syncope, facial asymmetry, speech difficulty, weakness and headaches.   Psychiatric/Behavioral:  Negative for behavioral problems, confusion and hallucinations.     Vital signs:  Visit Vitals  /74   Pulse 93   Temp 36 °C (96.8 °F)   Resp 20     Physical examination:  General: Awake, alert, oriented x3, no distress, cooperative.  HEENT: EOM intact, PERRLA.  Neck: Supple, no JVD, no masses, no lymphadenopathy.  Chest: Normal breath sounds bilateral, good chest expansion, no wheezes, no crackles, no rhonchi.  Heart: Regular rate and rhythm, S1-S2 normal, no murmur, no gallops.  Abdomen: Soft, nontender, no organomegaly, no ascites, no guarding or rigidity.  Neurological: Alert and oriented x3, cranial nerves are intact, normal power and tone of 4 limbs.  Skin: No lesions, no skin rash.  Warm and dry.  Musculoskeletal: Normal, atraumatic, no obvious deformities.  Legs: No leg edema, no clubbing or cyanosis.  Psych: Appropriate mood and behavior.    Laboratory Data:   Lab Results   Component Value Date    WBC 8.1 07/15/2025    HGB 14.5 07/15/2025    HCT 45.3 (H) 07/15/2025    MCV 95.4 07/15/2025     07/15/2025     Lab Results   Component Value Date    GLUCOSE 100 07/15/2025    CALCIUM 9.1 07/15/2025     07/15/2025    K 4.2 07/15/2025    CO2 25 07/15/2025     07/15/2025    BUN 12 07/15/2025    CREATININE 0.74 07/15/2025     Lab Results   Component Value Date    ALT 6 07/15/2025    AST 13 07/15/2025    ALKPHOS 50 07/15/2025    BILITOT 0.6 07/15/2025     Assessment and plan:    #1 status post left total reverse shoulder replacement/arthroplasty: This was done for severe arthritis of the left shoulder, postop day 0.  Currently, patient denies any further pain.  Hemodynamic, she is stable.  Preoperative routine blood work was reviewed as above.  She is already on Norco as needed for pain.  She is on IV clindamycin for perioperative  prophylaxis.  Orthopedic surgery is following.  Plan to do CBC and BMP tomorrow morning.    #2 hypertension: Blood pressure stable, continue losartan and HCTZ.    #3 hyperlipidemia: Continue pravastatin.    #4 anxiety/depression: Continue Zoloft.    #5 DVT prophylaxis: SCDs, can start chemical prophylaxis when surgery is agreeable.    PATRICK GALO MD.  07/23/25         [1]   Past Medical History:  Diagnosis Date    Asthma     Combined forms of age-related cataract, bilateral     Corneal disorder due to contact lens, bilateral     Dry eyes     Hyperlipidemia     Hypertension     Impacted cerumen of right ear 09/10/2023    Personal history of other diseases of the circulatory system     History of hypertension    Puckering of macula, right eye     Refractive error     Vitreous degeneration, bilateral    [2]   Past Surgical History:  Procedure Laterality Date    BREAST BIOPSY      GASTROSTOMY      gallbladder    OTHER SURGICAL HISTORY  04/19/2021    Oral surgery    OTHER SURGICAL HISTORY  04/19/2021    Knee surgery   [3]   Current Facility-Administered Medications:     clindamycin (Cleocin) 600 mg in dextrose 5% IV 50 mL, 600 mg, intravenous, q8h, Maria L Wright PA-C    HYDROcodone-acetaminophen (Norco) 5-325 mg per tablet 1 tablet, 1 tablet, oral, q6h PRN, Maria L Wright PA-C

## 2025-07-23 NOTE — ANESTHESIA PREPROCEDURE EVALUATION
Patient: Sue Gar    Procedure Information       Date/Time: 07/23/25 1010    Procedure: ARTHROPLASTY, SHOULDER, TOTAL, REVERSE (Left: Shoulder)    Location: [facility] / [facility]    Surgeons: Manuel Phoenix MD            Relevant Problems   Anesthesia (within normal limits)      Cardiac   (+) Benign hypertension   (+) Pure hypercholesterolemia      Pulmonary   (+) Asthma   (+) Chronic obstructive pulmonary disease (Multi)      Neuro   (+) Anxiety   (+) Depression      GI   (+) GERD (gastroesophageal reflux disease)      /Renal (within normal limits)      Liver   (+) Calculus of gallbladder with chronic cholecystitis without obstruction   (+) Gallstone pancreatitis (HHS-HCC)      Endocrine (within normal limits)      Hematology (within normal limits)      Musculoskeletal   (+) Degenerative joint disease      HEENT   (+) Acute sinusitis      ID (within normal limits)      Skin (within normal limits)      GYN (within normal limits)       Clinical information reviewed:   Tobacco  Allergies  Meds   Med Hx  Surg Hx  OB Status  Fam Hx  Soc   Hx      Vitals:    07/23/25 0932   BP: 131/84   Pulse: 64   Resp: 18   Temp: 36.3 °C (97.3 °F)   SpO2: 98%       Surgical History[1]  Medical History[2]  Current Medications[3]  Prior to Admission medications   Medication Sig Start Date End Date Taking? Authorizing Provider   chlorhexidine (Peridex) 0.12 % solution Use as directed 7/15/25  Yes Dai Muhammad, APRN-CNP, DNP   cholecalciferol (Vitamin D3) 25 MCG (1000 UT) tablet Take 1 tablet (1,000 Units) by mouth once daily.   Yes Historical Provider, MD   hydroCHLOROthiazide (Microzide) 12.5 mg tablet Take 1 tablet (12.5 mg) by mouth once daily. 7/1/25  Yes Maloclm Hylton DO   losartan (Cozaar) 50 mg tablet Take 1 tablet (50 mg) by mouth once daily. 7/1/25  Yes Malcolm Hylton DO   naproxen (Naprosyn) 250 mg tablet Take 1 tablet (250 mg) by mouth 2 times daily (morning and late afternoon).   Yes Historical Provider,  MD   pravastatin (Pravachol) 40 mg tablet Take 1 tablet (40 mg) by mouth once daily. 7/1/25  Yes Malcolm Hylton DO   sertraline (Zoloft) 50 mg tablet Take 1 tablet (50 mg) by mouth once daily. 7/1/25  Yes Malcolm Hylton DO     RX Allergies[4]  Social History     Tobacco Use    Smoking status: Every Day     Current packs/day: 0.50     Average packs/day: 0.5 packs/day for 60.0 years (30.0 ttl pk-yrs)     Types: Cigarettes     Start date: 7/15/1965     Passive exposure: Current    Smokeless tobacco: Never   Substance Use Topics    Alcohol use: Yes     Comment: friday night, 3-5 drinks         Chemistry    Lab Results   Component Value Date/Time     07/15/2025 1041    K 4.2 07/15/2025 1041     07/15/2025 1041    CO2 25 07/15/2025 1041    BUN 12 07/15/2025 1041    CREATININE 0.74 07/15/2025 1041    Lab Results   Component Value Date/Time    CALCIUM 9.1 07/15/2025 1041    ALKPHOS 50 07/15/2025 1041    AST 13 07/15/2025 1041    ALT 6 07/15/2025 1041    BILITOT 0.6 07/15/2025 1041          Lab Results   Component Value Date/Time    WBC 8.1 07/15/2025 1041    HGB 14.5 07/15/2025 1041    HCT 45.3 (H) 07/15/2025 1041     07/15/2025 1041     Lab Results   Component Value Date/Time    PROTIME 10.4 09/17/2018 1010    INR 1.0 09/17/2018 1010     Encounter Date: 07/15/25   ECG 12 lead   Result Value    Ventricular Rate 76    Atrial Rate 76    FL Interval 118    QRS Duration 82    QT Interval 408    QTC Calculation(Bazett) 459    R Axis -7    T Axis -19    QRS Count 12    Q Onset 229    P Onset 170    P Offset 223    T Offset 433    QTC Fredericia 441    Narrative    Sinus rhythm with Premature atrial complexes  Nonspecific ST and T wave abnormality  Abnormal ECG  When compared with ECG of 22-NOV-2021 11:08,  No significant change was found  Confirmed by Pan Vergara ([id number]) on 7/17/2025 8:13:07 PM         NPO Detail:  NPO/Void Status  Carbohydrate Drink Given Prior to Surgery? : N  Date of Last Liquid:  25  Time of Last Liquid: 0600 (jeferson coffee about 4 oz)  Date of Last Solid: 25  Time of Last Solid: 2100  Last Intake Type: Clear fluids; Food  Time of Last Void: 09         Physical Exam    Airway  Mallampati: III  TM distance: >3 FB  Neck ROM: full  Mouth openin finger widths     Cardiovascular    Dental        Pulmonary    Abdominal            Anesthesia Plan    History of general anesthesia?: yes  History of complications of general anesthesia?: no    ASA 3     general and regional     The patient is not a current smoker.  Patient was not previously instructed to abstain from smoking on day of procedure.  Patient did not smoke on day of procedure.  Education provided regarding risk of obstructive sleep apnea.  intravenous induction   Anesthetic plan and risks discussed with patient.    Plan discussed with CRNA and CAA.           [1]   Past Surgical History:  Procedure Laterality Date    BREAST BIOPSY      GASTROSTOMY      gallbladder    OTHER SURGICAL HISTORY  2021    Oral surgery    OTHER SURGICAL HISTORY  2021    Knee surgery   [2]   Past Medical History:  Diagnosis Date    Asthma     Combined forms of age-related cataract, bilateral     Corneal disorder due to contact lens, bilateral     Dry eyes     Hyperlipidemia     Hypertension     Impacted cerumen of right ear 09/10/2023    Personal history of other diseases of the circulatory system     History of hypertension    Puckering of macula, right eye     Refractive error     Vitreous degeneration, bilateral    [3]   Current Facility-Administered Medications:     clindamycin (Cleocin) 900 mg in dextrose 5% IV 50 mL, 900 mg, intravenous, Once, Maria L Wright PA-C    fentaNYL PF (Sublimaze) injection 50 mcg, 50 mcg, intravenous, Once, Vazquez Mccauley DO    midazolam (Versed) injection 2 mg, 2 mg, intravenous, Once, Patricia Jay, PharmD  [4]   Allergies  Allergen Reactions    Ciprofloxacin Anaphylaxis     Hives, rash, swelling     Erythromycin Other and Hives     Stomach upset, stabbing pains    Levaquin [Levofloxacin] Anaphylaxis    Morphine Anaphylaxis, Other and Unknown     Severe anaphylaxis    VERY SENSITIVE to morphine; given 4mg and became unresponsive and hypotensive, but no hives to suggest allergy; responded to stimulus and IVF, did not require narcan    Moxifloxacin Anaphylaxis     Hives, respiratory distress    Penicillin Anaphylaxis     Hives, respiratory distress    Atorvastatin Myalgia    Iodinated Contrast Media Hives    Percocet [Oxycodone-Acetaminophen] Other     nightmares

## 2025-07-23 NOTE — NURSING NOTE
0130pm Pt arrived to unit to room 221, from [facility]. Pt A/Ox 4 denies any c/o pains no distress noted. Pt has left shoulder adaptic dressing JESUS dry and intact with sling in place and ice pack. Pt oriented to room and surroundings with call light use with call light placed in reach.

## 2025-07-23 NOTE — ANESTHESIA POSTPROCEDURE EVALUATION
Patient: Sue Gar    Procedure Summary       Date: 07/23/25 Room / Location: [facility]    Anesthesia Start: 1116 Anesthesia Stop: 1300    Procedure: ARTHROPLASTY, SHOULDER, TOTAL, REVERSE (Left: Shoulder) Diagnosis:       Osteoarthritis of left shoulder, unspecified osteoarthritis type      (Osteoarthritis of left shoulder, unspecified osteoarthritis type [M19.012])    Surgeons: Manuel Phoenix MD Responsible Provider: Justin Osei MD    Anesthesia Type: general, regional ASA Status: 3            Anesthesia Type: general, regional    Vitals Value Taken Time   /83 07/23/25 13:00   Temp 97 07/23/25 13:00   Pulse 99 07/23/25 13:00   Resp 14 07/23/25 13:00   SpO2 96 07/23/25 13:00       Anesthesia Post Evaluation    Patient participation: complete - patient participated  Level of consciousness: awake  Pain score: 0  Pain management: adequate  Airway patency: patent  Cardiovascular status: acceptable  Respiratory status: acceptable  Hydration status: acceptable  Postoperative Nausea and Vomiting: none        There were no known notable events for this encounter.

## 2025-07-23 NOTE — CARE PLAN
RN requested patient be placed on oxygen; PCT repositioned pulse oximetry probe; SpO2 95% hr 64; Oxygen off; nasal cannula on Standby.

## 2025-07-23 NOTE — ANESTHESIA PROCEDURE NOTES
Airway  Date/Time: 7/23/2025 11:31 AM  Reason: elective    Airway not difficult    Staffing  Performed: CRNA   Authorized by: Justin Osei MD    Performed by: KHOA Arauz-MARLENA  Patient location during procedure: OR    Patient Condition  Indications for airway management: anesthesia and airway protection  Patient position: sniffing  Sedation level: deep     Final Airway Details   Preoxygenated: yes  Final airway type: endotracheal airway  Successful airway: ETT  Cuffed: yes   Successful intubation technique: video laryngoscopy  Adjuncts used in placement: intubating stylet and cricoid pressure  Endotracheal tube insertion site: oral  Blade: Tremayne  Blade size: #3  ETT size (mm): 7.0  Cormack-Lehane Classification: grade I - full view of glottis  Placement verified by: chest auscultation and capnometry   Measured from: lips  ETT to lips (cm): 21  Number of attempts at approach: 1

## 2025-07-23 NOTE — ANESTHESIA PROCEDURE NOTES
Peripheral Block    Patient location during procedure: pre-op  Medication administered at: 7/23/2025 10:24 AM  End time: 7/23/2025 10:28 AM  Reason for block: at surgeon's request, post-op pain management and acute pain service  Staffing  Performed: resident and attending   Authorized by: Justin Osei MD    Performed by: Vazquez Mccauley DO  Preanesthetic Checklist  Completed: patient identified, IV checked, site marked, risks and benefits discussed, surgical consent, monitors and equipment checked, pre-op evaluation and timeout performed   Timeout performed at: 7/23/2025 10:19 AM  Peripheral Block  Patient position: laying flat  Prep: ChloraPrep  Patient monitoring: heart rate, continuous pulse ox and cardiac monitor  Block type: interscalene  Laterality: left  Injection technique: single-shot  Guidance: ultrasound guided  Needle  Needle type: short-bevel   Needle gauge: 22 G  Needle length: 5 cm  Needle localization: ultrasound guidance       image stored in chart  Assessment  Injection assessment: negative aspiration for heme, incremental injection, local visualized surrounding nerve on ultrasound and no paresthesia on injection  Paresthesia pain: none  Heart rate change: no  Slow fractionated injection: yes  Additional Notes  Interscalene brachial plexus block:     Prior to procedure: Following a focused history, procedure-related and patient-specific complications were discussed. Risks, benefits, and alternatives were explained. Informed, written consent was provided by the patient and/or surrogate decision maker for the block. Anticoagulation (if any) was held per ARTEMIO guidelines. ASA monitors were applied. Patient was positioned, prepped with chlorhexidine.    Ultrasound guidance used to visualize the brachial plexus and surrounding structures with visualization of the needle throughout duration of the procedure.  Needle was inserted and advanced towards target with visualization of the needle throughout  duration of the procedure. A total of 20 cc of 0.5% ropivacaine, dexamethasone 5mg, was divided and injected bilaterally. Patient tolerated procedure well.    Timeout block RN, myself and Dr. Osei  Medications Administered  ropivacaine (NAROPIN) 5 MG/ML Perineural - perineural injection   20 mL - 7/23/2025 10:24:00 AM  dexAMETHasone (Decadron) injection - perineural injection   5 mg - 7/23/2025 10:24:00 AM

## 2025-07-23 NOTE — NURSING NOTE
Patient's report taken from day shift RN. Observed to be comfortable in bed and free from pain. Call light within reach.

## 2025-07-23 NOTE — OP NOTE
Same with biceps tendon tear and severe tenosynovitis ARTHROPLASTY, SHOULDER, TOTAL, REVERSE (L) Operative Note     Date: 2025  OR Location: [facility]    Name: Sue Gar, : 1945, Age: 80 y.o., MRN: [id number], Sex: female    Diagnosis  Pre-op Diagnosis      * Osteoarthritis of left shoulder, unspecified osteoarthritis type [M19.012] Post-op Diagnosis     * Osteoarthritis of left shoulder, unspecified osteoarthritis type [M19.012]     Procedures  ARTHROPLASTY, SHOULDER, TOTAL, REVERSE  84639 - SD ARTHROPLASTY GLENOHUMERAL JOINT TOTAL SHOULDER  #1 left Arthrex reverse total shoulder replacement  #2 left shoulder open biceps tenodesis    Surgeons      * Manuel Phoenix - Primary    Resident/Fellow/Other Assistant:  Surgeons and Role:  * No surgeons found with a matching role *  Maria L Wright PA-C  Staff:   Circulator: Bety  Scrabdulaziz Person: Liss Oliva Person: Caroline Mcgrath Circulator: Shantell Mcgrath Scrub: Linda    Anesthesia Staff: Anesthesiologist: Justin Osei MD  CRNA: KHOA Arauz-MARLENA    Procedure Summary  Anesthesia: Regional, General  ASA: III  Estimated Blood Loss: 55mL  Intra-op Medications:   Administrations occurring from 1010 to 1225 on 25:   Medication Name Total Dose   vancomycin (Vancocin) vial for injection 1 g   tranexamic acid (Cyklokapron) injection 1 g   ePHEDrine injection 50 mg   ondansetron 2 mg/mL 4 mg   propofol (Diprivan) injection 10 mg/mL 200 mg   rocuronium (ZeMuron) 50 mg/5 mL injection 40 mg   vancomycin 1 g 1 g   clindamycin (Cleocin) 900 mg in dextrose 5% IV 50 mL 900 mg   dexAMETHasone (Decadron) 4 mg/mL IV Syringe 2 mL 9 mg   fentaNYL PF (Sublimaze) injection 50 mcg 50 mcg   midazolam (Versed) injection 2 mg 2 mg   ropivacaine (Naropin) injection 0.5 % 20 mL              Anesthesia Record               Intraprocedure I/O Totals       None           Specimen: No specimens collected              Drains and/or Catheters: * None in log  *    Tourniquet Times:         Implants:  Implants       Type Name Action Serial No.      Joint GLENOID PIN, TARGETER, 2.8MM, STAINLESS - [id number] Implanted      Joint BASEPLATE, 24MM, 10 DEG, FULL AUGMENT, +2 LAT - [id number] Implanted      Implant POST, MODULAR, 25MM - [id number] Implanted      Screw SCREW, LOCKING, 5.5MM X 20MM - [id number] Implanted      Screw SCREW, LOCKING, 5.5MM X 16MM - [id number] Implanted      Joint GLENOSPHERE, 36/24 BASEPLATE TAPER - [id number] Implanted       33MM ARTHREX SUTURE CUP Implanted      Joint STEM, HUMERAL, UNIVERS REVERS, SZ 7MM - [id number] Implanted       33MM +3 / 36MM, UNIVERS REVERS COMBINATION HUMERAL LINER, CONSTRAINED Implanted      Screw SCREW, NON-LOCKING, 4.5MM X 24MM - [id number] Implanted      Screw SCREW, NON-LOCKING, 4.5MM X 24MM - [id number] Implanted               Findings: Severe rotator cuff arthritis with superior and posterior bone loss    Indications: Sue Gar is an 80 y.o. female who is having surgery for Osteoarthritis of left shoulder, unspecified osteoarthritis type [M19.012].  Pleasant patient comes in for reverse shoulder replacement understand clear there are severe risk such as nerve artery tendon damage infection continued pain and the major risk being continued pain hardware failure and the amount of recovery and pain relief can be variable.  Understands these risk completely wished to proceed informed consent obtained    The patient was seen in the preoperative area. The risks, benefits, complications, treatment options, non-operative alternatives, expected recovery and outcomes were discussed with the patient. The possibilities of reaction to medication, pulmonary aspiration, injury to surrounding structures, bleeding, recurrent infection, the need for additional procedures, failure to diagnose a condition, and creating a complication requiring transfusion or operation were discussed with the patient. The patient concurred with the  proposed plan, giving informed consent.  The site of surgery was properly noted/marked if necessary per policy. The patient has been actively warmed in preoperative area. Preoperative antibiotics have been ordered and given within 1 hours of incision. Venous thrombosis prophylaxis have been ordered including bilateral sequential compression devices    Procedure Details: Pleasant patient brought the operating room and after sterilely prepping draping forming timeout we did a standard deltopectoral approach protecting the cephalic vein going down opening up the Subscapularis Protecting the Axillary Nerve We Cut That in Standard Version Large Osteophyte Superior Erosion of the Glenoid but Still Good Bone Stock We Used 10 Degree Superior Posterior Augment with Our Guide and Central Pin We Had Excellent Bone Quality Otherwise and a Good Baseplate and Good Screw Purchases throughout We Did Use Inferior Offset on the Glenoid Ball and a Central Screw Secured This We Went up to a Size 7 on the Humeral Side Stem with a +3 Conforming Insert.  We Had No Overtensioning Good Range Of Motion Prior to the Case We Did Do a Biceps Tenodesis of the Torn and Inflamed Biceps with Partial Release Pectoralis Major Tendon at This Point We Had Excellent Alignment Bleeding Controlled Nicely We Did Topical TXA Chlorhexidine Wash Vancomycin Powder Layered Closure Performed There Were No Complications Maria L Wright PA-C Acted As Surgical Assistant during This Case and Her Assistance Greatly Reduced Operative Time and Aided in Performance of the Case.  No Qualified Resident Available  Evidence of Infection: No   Complications:  None; patient tolerated the procedure well.    Disposition: PACU - hemodynamically stable.  Condition: stable                 Additional Details: 0    Attending Attestation: I performed the procedure.    Manuel Phoenix  Phone Number: [phone]

## 2025-07-23 NOTE — CARE PLAN
Patient has no asthma symptoms; states she experienced Childhood Asthma; no medications at this time.    Problem: Respiratory  Goal: Clear secretions with interventions this shift  Outcome: Progressing  Goal: Minimize anxiety/maximize coping throughout shift  Outcome: Progressing  Goal: Minimal/no exertional discomfort or dyspnea this shift  Outcome: Progressing  Goal: No signs of respiratory distress (eg. Use of accessory muscles. Peds grunting)  Outcome: Progressing  Goal: Patent airway maintained this shift  Outcome: Progressing  Goal: Tolerate pulmonary toileting this shift  Outcome: Progressing  Goal: Verbalize decreased shortness of breath this shift  Outcome: Progressing  Goal: Increase self care and/or family involvement in next 24 hours  Outcome: Progressing

## 2025-07-24 ENCOUNTER — CLINICAL SUPPORT (OUTPATIENT)
Dept: INPATIENT UNIT | Facility: HOSPITAL | Age: 80
End: 2025-07-24
Payer: MEDICARE

## 2025-07-24 ENCOUNTER — APPOINTMENT (OUTPATIENT)
Facility: CLINIC | Age: 80
End: 2025-07-24
Payer: MEDICARE

## 2025-07-24 VITALS
WEIGHT: 202.6 LBS | HEART RATE: 70 BPM | OXYGEN SATURATION: 96 % | TEMPERATURE: 98.2 F | RESPIRATION RATE: 16 BRPM | BODY MASS INDEX: 35.9 KG/M2 | SYSTOLIC BLOOD PRESSURE: 116 MMHG | DIASTOLIC BLOOD PRESSURE: 63 MMHG | HEIGHT: 63 IN

## 2025-07-24 LAB
ANION GAP SERPL CALC-SCNC: 13 MMOL/L (ref 10–20)
BUN SERPL-MCNC: 13 MG/DL (ref 6–23)
CALCIUM SERPL-MCNC: 8.4 MG/DL (ref 8.6–10.3)
CHLORIDE SERPL-SCNC: 101 MMOL/L (ref 98–107)
CO2 SERPL-SCNC: 23 MMOL/L (ref 21–32)
CREAT SERPL-MCNC: 0.79 MG/DL (ref 0.5–1.05)
EGFRCR SERPLBLD CKD-EPI 2021: 77 ML/MIN/1.73M*2
ERYTHROCYTE [DISTWIDTH] IN BLOOD BY AUTOMATED COUNT: 14.1 % (ref 11.5–14.5)
GLUCOSE SERPL-MCNC: 130 MG/DL (ref 74–99)
HCT VFR BLD AUTO: 40.4 % (ref 36–46)
HGB BLD-MCNC: 12.9 G/DL (ref 12–16)
MCH RBC QN AUTO: 29.7 PG (ref 26–34)
MCHC RBC AUTO-ENTMCNC: 31.9 G/DL (ref 32–36)
MCV RBC AUTO: 93 FL (ref 80–100)
NRBC BLD-RTO: ABNORMAL /100{WBCS}
PLATELET # BLD AUTO: 222 X10*3/UL (ref 150–450)
POTASSIUM SERPL-SCNC: 4.8 MMOL/L (ref 3.5–5.3)
RBC # BLD AUTO: 4.35 X10*6/UL (ref 4–5.2)
SODIUM SERPL-SCNC: 132 MMOL/L (ref 136–145)
WBC # BLD AUTO: 10.9 X10*3/UL (ref 4.4–11.3)

## 2025-07-24 PROCEDURE — 2500000002 HC RX 250 W HCPCS SELF ADMINISTERED DRUGS (ALT 637 FOR MEDICARE OP, ALT 636 FOR OP/ED): Performed by: HOSPITALIST

## 2025-07-24 PROCEDURE — 2500000001 HC RX 250 WO HCPCS SELF ADMINISTERED DRUGS (ALT 637 FOR MEDICARE OP): Performed by: HOSPITALIST

## 2025-07-24 PROCEDURE — E0218 FLUID CIRC COLD PAD W PUMP: HCPCS | Performed by: ORTHOPAEDIC SURGERY

## 2025-07-24 PROCEDURE — 36415 COLL VENOUS BLD VENIPUNCTURE: CPT | Performed by: PHYSICIAN ASSISTANT

## 2025-07-24 PROCEDURE — 97161 PT EVAL LOW COMPLEX 20 MIN: CPT | Mod: GP

## 2025-07-24 PROCEDURE — 2500000001 HC RX 250 WO HCPCS SELF ADMINISTERED DRUGS (ALT 637 FOR MEDICARE OP): Performed by: PHYSICIAN ASSISTANT

## 2025-07-24 PROCEDURE — 7100000011 HC EXTENDED STAY RECOVERY HOURLY - NURSING UNIT

## 2025-07-24 PROCEDURE — 93005 ELECTROCARDIOGRAM TRACING: CPT

## 2025-07-24 PROCEDURE — 80048 BASIC METABOLIC PNL TOTAL CA: CPT | Performed by: PHYSICIAN ASSISTANT

## 2025-07-24 PROCEDURE — 99232 SBSQ HOSP IP/OBS MODERATE 35: CPT | Performed by: HOSPITALIST

## 2025-07-24 PROCEDURE — 97110 THERAPEUTIC EXERCISES: CPT | Mod: GP

## 2025-07-24 PROCEDURE — 2500000004 HC RX 250 GENERAL PHARMACY W/ HCPCS (ALT 636 FOR OP/ED): Performed by: PHYSICIAN ASSISTANT

## 2025-07-24 PROCEDURE — 85027 COMPLETE CBC AUTOMATED: CPT | Performed by: PHYSICIAN ASSISTANT

## 2025-07-24 PROCEDURE — 2500000004 HC RX 250 GENERAL PHARMACY W/ HCPCS (ALT 636 FOR OP/ED): Mod: JZ | Performed by: INTERNAL MEDICINE

## 2025-07-24 RX ORDER — OXYCODONE HYDROCHLORIDE 5 MG/1
5 TABLET ORAL EVERY 8 HOURS PRN
Qty: 15 TABLET | Refills: 0 | Status: SHIPPED | OUTPATIENT
Start: 2025-07-24

## 2025-07-24 RX ORDER — HYDROCODONE BITARTRATE AND ACETAMINOPHEN 5; 325 MG/1; MG/1
1 TABLET ORAL EVERY 6 HOURS PRN
Qty: 28 TABLET | Refills: 0 | Status: SHIPPED | OUTPATIENT
Start: 2025-07-24 | End: 2025-07-24 | Stop reason: HOSPADM

## 2025-07-24 RX ORDER — ASPIRIN 81 MG/1
81 TABLET ORAL DAILY
Qty: 30 TABLET | Refills: 0 | Status: SHIPPED | OUTPATIENT
Start: 2025-07-24 | End: 2025-08-23

## 2025-07-24 RX ORDER — DOXYCYCLINE 100 MG/1
100 CAPSULE ORAL 2 TIMES DAILY
Qty: 28 CAPSULE | Refills: 0 | Status: SHIPPED | OUTPATIENT
Start: 2025-07-24 | End: 2025-08-07

## 2025-07-24 RX ORDER — KETOROLAC TROMETHAMINE 15 MG/ML
15 INJECTION, SOLUTION INTRAMUSCULAR; INTRAVENOUS EVERY 6 HOURS PRN
Status: DISCONTINUED | OUTPATIENT
Start: 2025-07-24 | End: 2025-07-24 | Stop reason: HOSPADM

## 2025-07-24 RX ADMIN — PRAVASTATIN SODIUM 40 MG: 20 TABLET ORAL at 09:35

## 2025-07-24 RX ADMIN — HYDROCODONE BITARTRATE AND ACETAMINOPHEN 1 TABLET: 5; 325 TABLET ORAL at 09:34

## 2025-07-24 RX ADMIN — KETOROLAC TROMETHAMINE 15 MG: 15 INJECTION, SOLUTION INTRAMUSCULAR; INTRAVENOUS at 13:15

## 2025-07-24 RX ADMIN — HYDROCODONE BITARTRATE AND ACETAMINOPHEN 1 TABLET: 5; 325 TABLET ORAL at 03:08

## 2025-07-24 RX ADMIN — DOCUSATE SODIUM 100 MG: 50 LIQUID ORAL at 09:35

## 2025-07-24 RX ADMIN — Medication 25 MCG: at 09:35

## 2025-07-24 RX ADMIN — POLYETHYLENE GLYCOL 3350 17 G: 17 POWDER, FOR SOLUTION ORAL at 09:35

## 2025-07-24 RX ADMIN — CLINDAMYCIN PHOSPHATE 600 MG: 600 INJECTION, SOLUTION INTRAVENOUS at 02:58

## 2025-07-24 RX ADMIN — SERTRALINE 50 MG: 50 TABLET, FILM COATED ORAL at 09:35

## 2025-07-24 RX ADMIN — KETOROLAC TROMETHAMINE 15 MG: 15 INJECTION, SOLUTION INTRAMUSCULAR; INTRAVENOUS at 06:38

## 2025-07-24 ASSESSMENT — COGNITIVE AND FUNCTIONAL STATUS - GENERAL
DRESSING REGULAR UPPER BODY CLOTHING: A LITTLE
TOILETING: A LITTLE
WALKING IN HOSPITAL ROOM: A LITTLE
TURNING FROM BACK TO SIDE WHILE IN FLAT BAD: A LITTLE
MOBILITY SCORE: 23
HELP NEEDED FOR BATHING: A LITTLE
EATING MEALS: A LITTLE
CLIMB 3 TO 5 STEPS WITH RAILING: A LITTLE
PERSONAL GROOMING: A LITTLE
DRESSING REGULAR LOWER BODY CLOTHING: A LITTLE
MOBILITY SCORE: 22
DAILY ACTIVITIY SCORE: 18

## 2025-07-24 ASSESSMENT — PAIN SCALES - GENERAL
PAINLEVEL_OUTOF10: 9
PAINLEVEL_OUTOF10: 8
PAINLEVEL_OUTOF10: 10 - WORST POSSIBLE PAIN
PAINLEVEL_OUTOF10: 8
PAINLEVEL_OUTOF10: 4
PAINLEVEL_OUTOF10: 6
PAINLEVEL_OUTOF10: 6
PAINLEVEL_OUTOF10: 3
PAINLEVEL_OUTOF10: 6
PAINLEVEL_OUTOF10: 9
PAINLEVEL_OUTOF10: 9

## 2025-07-24 ASSESSMENT — PAIN - FUNCTIONAL ASSESSMENT
PAIN_FUNCTIONAL_ASSESSMENT: 0-10

## 2025-07-24 ASSESSMENT — PAIN DESCRIPTION - LOCATION
LOCATION: SHOULDER

## 2025-07-24 ASSESSMENT — PAIN DESCRIPTION - ORIENTATION
ORIENTATION: LEFT

## 2025-07-24 ASSESSMENT — PAIN DESCRIPTION - DESCRIPTORS
DESCRIPTORS: ACHING

## 2025-07-24 ASSESSMENT — ACTIVITIES OF DAILY LIVING (ADL): ADL_ASSISTANCE: INDEPENDENT

## 2025-07-24 NOTE — DISCHARGE SUMMARY
Discharge Diagnosis  Degenerative joint disease    Issues Requiring Follow-Up  2 week post-op     Test Results Pending At Discharge  Pending Labs       No current pending labs.            Hospital Course   No issues    Visit Vitals  /67   Pulse 76   Temp 36 °C (96.8 °F)   Resp 20     Vitals:    07/23/25 0922   Weight: 91.9 kg (202 lb 9.6 oz)       Immunization History   Administered Date(s) Administered    Flu vaccine, quadrivalent, high-dose, preservative free, age 65y+ (FLUZONE) 11/02/2021, 11/10/2022, 10/16/2023    Flu vaccine, trivalent, preservative free, HIGH-DOSE, age 65y+ (Fluzone) 11/16/2016, 09/28/2017, 11/06/2018, 11/02/2021    Influenza, trivalent, adjuvanted 10/22/2024    Pfizer COVID-19 vaccine, 12 years and older, (30mcg/0.3mL) (Comirnaty) 10/16/2023, 10/22/2024    Pfizer COVID-19 vaccine, bivalent, age 12 years and older (30 mcg/0.3 mL) 10/18/2022    Pfizer Purple Cap SARS-CoV-2 03/04/2021, 04/02/2021, 10/12/2021    Pneumococcal polysaccharide vaccine, 23-valent, age 2 years and older (PNEUMOVAX 23) 09/28/2017, 09/28/2017    Tdap vaccine, age 7 year and older (BOOSTRIX, ADACEL) 11/23/2018    Zoster vaccine, recombinant, adult (SHINGRIX) 03/28/2025, 06/23/2025    Zoster, live 05/11/2010       Results      Pertinent Physical Exam At Time of Discharge  Physical Exam    Home Medications     Medication List      START taking these medications     aspirin 81 mg EC tablet; Take 1 tablet (81 mg) by mouth once daily.   doxycycline 100 mg capsule; Commonly known as: Vibramycin; Take 1   capsule (100 mg) by mouth 2 times a day for 14 days. Take with at least 8   ounces (large glass) of water, do not lie down for 30 minutes after   HYDROcodone-acetaminophen 5-325 mg tablet; Commonly known as: Norco;   Take 1 tablet by mouth every 6 hours if needed for severe pain (7 - 10)   for up to 7 days.     CONTINUE taking these medications     chlorhexidine 0.12 % solution; Commonly known as: Peridex; Use as    directed   hydroCHLOROthiazide 12.5 mg tablet; Commonly known as: Microzide; Take 1   tablet (12.5 mg) by mouth once daily.   losartan 50 mg tablet; Commonly known as: Cozaar; Take 1 tablet (50 mg)   by mouth once daily.   naproxen 250 mg tablet; Commonly known as: Naprosyn   pravastatin 40 mg tablet; Commonly known as: Pravachol; Take 1 tablet   (40 mg) by mouth once daily.   sertraline 50 mg tablet; Commonly known as: Zoloft; Take 1 tablet (50   mg) by mouth once daily.   Vitamin D3 25 mcg (1,000 units) tablet; Generic drug: cholecalciferol       Outpatient Follow-Up  Future Appointments   Date Time Provider Department Center   11/4/2025  1:45 PM Max Hameed MD [id number]       Maria L Wright PA-C

## 2025-07-24 NOTE — NURSING NOTE
Patient notified this nurse at this time that she felt flushed after receiving norco earlier today. Her cheeks and neck are pink, no rash. Patient denies dizziness, lightheadedness, shortness of breath, itching, nausea or any other symptoms. No angioedema. VSS see VS flowsheet. Dr. Mendiola notified via secure chat. No new orders received at this time other than to notify the surgeon. Maria L Wright added to secure chat.

## 2025-07-24 NOTE — PROGRESS NOTES
"Sue Gar is a 80 y.o. female on day 0 of admission presenting with Degenerative joint disease.    Subjective   Pain controlled this morning doing well block is wearing off fully moving hand well.  Comfortable in bed had a long discussion with the patient about going home later today when she checks out with medicine in therapy and straightening the arm on a pillow when she is home taking the pillow out of the sling and just using a simple sling but straightening the elbow often doing active assisted elbow range of motion and simple pendulums for the shoulder.  We can get her into therapy after a week or so but she can do these initial range of motion exercises she can shower as discussed but keep the heavy stream off the wound       Objective     Physical Exam  Wound looks excellent today no significant hematoma moving the wrist and fingers well good pulses sensation  Last Recorded Vitals  Blood pressure 105/67, pulse 76, temperature 36 °C (96.8 °F), resp. rate 20, height 1.6 m (5' 2.99\"), weight 91.9 kg (202 lb 9.6 oz), SpO2 95%.  Intake/Output last 3 Shifts:  I/O last 3 completed shifts:  In: 1126.7 (12.3 mL/kg) [P.O.:240; I.V.:786.7 (8.6 mL/kg); IV Piggyback:100]  Out: 500 (5.4 mL/kg) [Urine:500 (0.2 mL/kg/hr)]  Weight: 91.9 kg     Relevant Results      Scheduled medications  Scheduled Medications[1]  Continuous medications  Continuous Medications[2]  PRN medications  PRN Medications[3]  Results for orders placed or performed during the hospital encounter of 07/23/25 (from the past 24 hours)   CBC   Result Value Ref Range    WBC 10.9 4.4 - 11.3 x10*3/uL    nRBC      RBC 4.35 4.00 - 5.20 x10*6/uL    Hemoglobin 12.9 12.0 - 16.0 g/dL    Hematocrit 40.4 36.0 - 46.0 %    MCV 93 80 - 100 fL    MCH 29.7 26.0 - 34.0 pg    MCHC 31.9 (L) 32.0 - 36.0 g/dL    RDW 14.1 11.5 - 14.5 %    Platelets 222 150 - 450 x10*3/uL   Basic metabolic panel   Result Value Ref Range    Glucose 130 (H) 74 - 99 mg/dL    Sodium 132 (L) 136 " - 145 mmol/L    Potassium 4.8 3.5 - 5.3 mmol/L    Chloride 101 98 - 107 mmol/L    Bicarbonate 23 21 - 32 mmol/L    Anion Gap 13 10 - 20 mmol/L    Urea Nitrogen 13 6 - 23 mg/dL    Creatinine 0.79 0.50 - 1.05 mg/dL    eGFR 77 >60 mL/min/1.73m*2    Calcium 8.4 (L) 8.6 - 10.3 mg/dL                            Assessment & Plan  Degenerative joint disease    Osteoarthritis of left shoulder, unspecified osteoarthritis type    Status post reverse total shoulder replacement, left    Plan will be for discharge home later today          Manuel Phoenix MD           [1] cholecalciferol, 25 mcg, oral, Daily  docusate sodium, 100 mg, oral, BID  hydroCHLOROthiazide, 12.5 mg, oral, Daily  losartan, 50 mg, oral, Daily  polyethylene glycol, 17 g, oral, Daily  pravastatin, 40 mg, oral, Daily  sertraline, 50 mg, oral, Daily    [2] lactated Ringer's, 100 mL/hr, Last Rate: Stopped (07/24/25 0631)    [3] PRN medications: HYDROcodone-acetaminophen, ketorolac, ondansetron ODT **OR** ondansetron, oxygen

## 2025-07-24 NOTE — NURSING NOTE
Reviewed prescriptions, self care, and follow up with patient and her spouse. They stated they understood and did not need anything further upon DC. Patient complained of pain but refused any further medication prior to discharge. Patient stated she had all of her belongings. Discharge paperwork and paper prescription provided to patient in her folder and given to spouse. Pt had sling on upon DC. IV removed, bleeding controlled with 2x2 and tape. She was wheeled to her 's car and assisted into his vehicle with seatbelt in place.

## 2025-07-24 NOTE — CARE PLAN
The patient's goals for the shift include pain control    The clinical goals for the shift include Pain control    Patient able to make needs known. She participates in her care and eager for PT.

## 2025-07-24 NOTE — CARE PLAN
The patient's goals for the shift include pain control    The clinical goals for the shift include pain control      Problem: Pain - Adult  Goal: Verbalizes/displays adequate comfort level or baseline comfort level  Outcome: Progressing     Problem: Safety - Adult  Goal: Free from fall injury  Outcome: Progressing     Problem: Discharge Planning  Goal: Discharge to home or other facility with appropriate resources  Outcome: Progressing     Problem: Chronic Conditions and Co-morbidities  Goal: Patient's chronic conditions and co-morbidity symptoms are monitored and maintained or improved  Outcome: Progressing     Problem: Nutrition  Goal: Nutrient intake appropriate for maintaining nutritional needs  Outcome: Progressing     Problem: Respiratory  Goal: Clear secretions with interventions this shift  Outcome: Progressing  Goal: Minimize anxiety/maximize coping throughout shift  Outcome: Progressing  Goal: Minimal/no exertional discomfort or dyspnea this shift  Outcome: Progressing  Goal: No signs of respiratory distress (eg. Use of accessory muscles. Peds grunting)  Outcome: Progressing  Goal: Patent airway maintained this shift  Outcome: Progressing  Goal: Tolerate pulmonary toileting this shift  Outcome: Progressing  Goal: Verbalize decreased shortness of breath this shift  Outcome: Progressing  Goal: Increase self care and/or family involvement in next 24 hours  Outcome: Progressing

## 2025-07-24 NOTE — PROGRESS NOTES
Hospitalist Progress Note    Patient's name: Sue Gar  YOB: 1945  Age: 80 y.o.  Medical record number: [id number]    Date of service: 07/24/25  Time: 8:52 AM    Chief Complaint: Follow-up for postoperative medical management after underwent left total reverse shoulder replacement.    Subjective:  Patient seen and examined.  She is doing good today.  Pain is under control.  No new complaints.  She is afebrile, heart rate stable, blood pressure stable, on room air.    Medications:  Current Medications[1]    Vital signs in last 24 hours:  Temp:  [36 °C (96.8 °F)-36.3 °C (97.3 °F)] 36 °C (96.8 °F)  Heart Rate:  [] 76  Resp:  [16-25] 20  BP: (104-151)/(67-99) 105/67    Physical Exam:  General: Awake, alert, oriented x3, no distress, cooperative.  HEENT: EOM intact, PERRLA.  Neck: Supple, no JVD, no masses, no lymphadenopathy.  Chest: Normal breath sounds bilateral, good chest expansion, no wheezes, no crackles, no rhonchi.  Heart: Regular rate and rhythm, S1-S2 normal, no murmur, no gallops.  Abdomen: Soft, nontender, no organomegaly, no ascites, no guarding or rigidity.  Neurological: Alert and oriented x3, cranial nerves are intact, normal power and tone of 4 limbs.  Skin: No lesions, no skin rash.  Warm and dry.  Musculoskeletal: Normal, atraumatic, no obvious deformities.  Legs: No leg edema, no clubbing or cyanosis.  Psych: Appropriate mood and behavior.    LABS:  Lab Results   Component Value Date    WBC 10.9 07/24/2025    HGB 12.9 07/24/2025    HCT 40.4 07/24/2025    MCV 93 07/24/2025     07/24/2025     Lab Results   Component Value Date    GLUCOSE 130 (H) 07/24/2025    CALCIUM 8.4 (L) 07/24/2025     (L) 07/24/2025    K 4.8 07/24/2025    CO2 23 07/24/2025     07/24/2025    BUN 13 07/24/2025    CREATININE 0.79 07/24/2025     Assessment and Plan:  #1 status post left total reverse shoulder replacement/arthroplasty: This was done for severe arthritis of the left shoulder,  postop day 0.  Currently, patient denies any further pain.  Hemodynamic, she is stable.  Preoperative routine blood work was reviewed as above.  She is already on Norco as needed for pain.  She is on IV clindamycin for perioperative prophylaxis.  Orthopedic surgery is following.  Plan to do CBC and BMP tomorrow morning.     #2 hypertension: Blood pressure stable, continue losartan and HCTZ.     #3 hyperlipidemia: Continue pravastatin.     #4 anxiety/depression: Continue Zoloft.     #5 DVT prophylaxis: SCDs, can start chemical prophylaxis when surgery is agreeable.    Daily progress/update:  7/24/2025:  Hemodynamically stable, afebrile, on room air, pain is under control, orthopedic surgery is following, today CBC and BMP reviewed as above, sodium is 132, slightly low but has been this low in the past, plan is to discontinue IV fluids, patient is medically stable for discharge today, no change to her home medications.    MDM: Moderate complexity, time spent is 42 minutes     TANGELA GALO MD.    07/24/25  12:20 PM         [1]   Current Facility-Administered Medications:     cholecalciferol (Vitamin D-3) tablet 25 mcg, 25 mcg, oral, Daily, Tangela Guerin MD, 25 mcg at 07/23/25 1535    docusate sodium (Colace) oral liquid 100 mg, 100 mg, oral, BID, Maria L Wright PA-C, 100 mg at 07/23/25 2015    hydroCHLOROthiazide (HYDRODiuril) tablet 12.5 mg, 12.5 mg, oral, Daily, Tangela Guerin MD, 12.5 mg at 07/23/25 1534    HYDROcodone-acetaminophen (Norco) 5-325 mg per tablet 1 tablet, 1 tablet, oral, q6h PRN, Maria L Wright PA-C, 1 tablet at 07/24/25 0308    ketorolac (Toradol) injection 15 mg, 15 mg, intravenous, q6h PRN, Jamey Garner MD, 15 mg at 07/24/25 0638    lactated Ringer's infusion, 100 mL/hr, intravenous, Continuous, Maria L Wright PA-C, Stopped at 07/24/25 0631    losartan (Cozaar) tablet 50 mg, 50 mg, oral, Daily, Tangela Guerin MD, 50 mg at 07/23/25 1534    ondansetron ODT (Zofran-ODT)  disintegrating tablet 4 mg, 4 mg, oral, q8h PRN **OR** ondansetron (Zofran) injection 4 mg, 4 mg, intravenous, q8h PRN, Maria L Wright PA-C    oxygen (O2) therapy, , inhalation, Daily PRN, Manuel Phoenix MD, Start at 07/23/25 2332    polyethylene glycol (Glycolax, Miralax) packet 17 g, 17 g, oral, Daily, Maria L Wright PA-C    pravastatin (Pravachol) tablet 40 mg, 40 mg, oral, Daily, Tangela Guerin MD, 40 mg at 07/23/25 1534    sertraline (Zoloft) tablet 50 mg, 50 mg, oral, Daily, Tangela Guerin MD, 50 mg at 07/23/25 1534

## 2025-07-24 NOTE — NURSING NOTE
Dr. Mendiola at bedside. Patient updated with changes to her homegoing medications. Pt states she has not had a bad reaction to oxycodone in the past. Paper prescription provided to patient in the front pocket of her folder as part of her discharge paperwork.

## 2025-07-24 NOTE — DISCHARGE INSTRUCTIONS
You had a shoulder replacement done yesterday. You had a nerve block prior to surgery that will make the arm numb for 18-24 hours after surgery. You may feel tingling in the hand and arm as the block wears off which is very normal.    Bruising and some swelling into the shoulder, elbow, and chest region is very normal, gravuty will slowly pull this down as well even into the abdomen and lower arm. Any severe swelling or redness please call the office.     Pain medication, antibiotics, and aspirin will be sent to your pharmacy, if you are ABLE, you can take 800mg Ibuprofen as well or alternate the pain medication and ibuprofen every 4-6 hours.    Once nerve block has worn off, you may remove abduction pillow from sling, you can use the sling with activity and at night but while sitting and laying down ALLOW ELBOW TO STRAIGHTEN OUT OF SLING as much as possible.    Ok to move wrist and fingers fully immediately after surgery, this will help pump fluid and swelling out of the hand and forearm and do your home exercises shown to you by PT.     Please call the office at [phone] for a follow up in 10-14 days post-op

## 2025-07-24 NOTE — PROGRESS NOTES
Physical Therapy Evaluation & Treatment    Patient Name: Sue Gar  MRN: [id number]  Department: [facility]  Room: [address] [place], [place] [address]  Today's Date: 7/24/2025   Time Calculation  Start Time: 1158  Stop Time: 1222  Time Calculation (min): 24 min    Assessment/Plan   PT Assessment  PT Assessment Results: Decreased strength, Decreased range of motion, Impaired balance, Orthopedic restrictions, Pain, Decreased coordination  Rehab Prognosis: Good  Barriers to Discharge Home: No anticipated barriers  Evaluation/Treatment Tolerance: Patient tolerated treatment well  End of Session Communication: Bedside nurse  Assessment Comment: Pt presents with impaired functional mobility s/p L RTSR. Recommend discharge home with 24 hour supervision/intermittent assistance and outpatient PT once cleared by surgeon.   End of Session Patient Position: Up in chair, BLE elevated, ice to surgical site, call light in reach, needs met, spouse present, RN aware.  IP OR SWING BED PT PLAN  Inpatient or Swing Bed: Inpatient  PT Plan  Treatment/Interventions: Bed mobility, Transfer training, Gait training, Balance training, Stair training, Strengthening, Range of motion, Therapeutic exercise, Therapeutic activity, Home exercise program, Positioning, Postural re-education  PT Plan: Ongoing PT  PT Frequency: 5 times per week  PT Discharge Recommendations: Low intensity level of continued care  Equipment Recommended upon Discharge: Straight cane  PT Recommended Transfer Status: Contact guard, Assistive device  PT - OK to Discharge: Yes      Subjective     PT Visit Info:  PT Received On: 07/24/25  General Visit Information:  General  Reason for Referral: L reverse total shoulder replacement  Referred By: Dr. Phoenix  Past Medical History Relevant to Rehab: HTN, OA, HLD, anxiety, depression  Family/Caregiver Present: Yes (spouse)  Prior to Session Communication: Bedside nurse  Patient Position Received: Bed, 4 rail up, Alarm on  General Comment: . L shoulder  post-op dressing dry and intact.     Home Living:  Home Living  Type of Home: House  Lives With: Spouse  Home Layout: One level  Home Access: Level entry  Prior Level of Function:  Prior Function Per Pt/Caregiver Report  Level of [place]: Independent with ADLs and functional transfers, Independent with homemaking with ambulation  ADL Assistance: Independent  Homemaking Assistance: Independent  Ambulatory Assistance: Independent  Vocational: Retired  Prior Function Comments: Pt denies falls prior to admission.  Precautions:  Precautions  UE Weight Bearing Status: Left Non-Weight Bearing  LE Weight Bearing Status: Weight Bearing as Tolerated  Medical Precautions: Fall precautions  Post-Surgical Precautions: Left shoulder precautions  Braces Applied: PT instructed patient in donning and doffing sling and appropriate wear. Pt and spouse verbalized understanding.    Objective   Pain:  Pain Assessment  Pain Assessment: 0-10  0-10 (Numeric) Pain Score: 3  Pain Type: Surgical pain  Pain Location: Shoulder  Pain Orientation: Left  Pain Interventions: Repositioned, Elevated  Cognition:  Cognition  Overall Cognitive Status: Within Functional Limits  Attention: Within Functional Limits  Memory: Within Funtional Limits  Problem Solving: Within Functional Limits  Numeric Reasoning: Within Functional Limits  Abstract Reasoning: Within Functional Limits  Safety/Judgement: Within Functional Limits    General Assessments:  Activity Tolerance  Endurance: Endurance does not limit participation in activity    Sensation  Light Touch: Partial deficits in the LUE (from pre-op nerve block)    Coordination  Movements are Fluid and Coordinated: No  Upper Body Coordination: LUE deficits from surgery and pre-op nerve block  Lower Body Coordination: WFL    Static Sitting Balance  Static Sitting-Balance Support: Feet supported  Static Sitting-Level of Assistance: Independent  Dynamic Sitting Balance  Dynamic Sitting-Balance Support: Feet  supported  Dynamic Sitting-Level of Assistance: Independent    Static Standing Balance  Static Standing-Balance Support: Right upper extremity supported (hand held assist)  Static Standing-Level of Assistance: Contact guard  Dynamic Standing Balance  Dynamic Standing-Balance Support: Right upper extremity supported (hand held assist)  Dynamic Standing-Level of Assistance: Contact guard  Functional Assessments:     Bed Mobility  Bed Mobility: Yes  Bed Mobility 1  Bed Mobility 1: Supine to sitting  Level of Assistance 1: Distant supervision    Transfers  Transfer: Yes  Transfer 1  Transfer From 1: Bed to  Transfer to 1: Chair with arms  Technique 1: Sit to stand, Stand to sit  Transfer Level of Assistance 1: Close supervision    Ambulation/Gait Training  Ambulation/Gait Training Performed: Yes  Ambulation/Gait Training 1  Surface 1: Level tile  Assistance 1: Hand held assistance, Contact guard  Quality of Gait 1: Decreased step length (decreased ya, mild postural sway, no LOB noted, reciprocal pattern.)  Comments/Distance (ft) 1: 150 feet x 1  Extremity/Trunk Assessments:  RUE   RUE : Within Functional Limits  LUE   LUE: Exceptions to WFL, shoulder ROM/strength limited due to post-op pain and surgeon restrictions.   RLE   RLE : Within Functional Limits  LLE   LLE : Within Functional Limits  Treatments:  Therapeutic Exercise  Therapeutic Exercise Performed: Yes  LUE elbow flexion/extension, wrist pronation/supination, wrist flexion/extension, and hand opening/closing x 10 reps.    Outcome Measures:  [facility] Basic Mobility  Turning from your back to your side while in a flat bed without using bedrails: None  Moving from lying on your back to sitting on the side of a flat bed without using bedrails: None  Moving to and from bed to chair (including a wheelchair): None  Standing up from a chair using your arms (e.g. wheelchair or bedside chair): None  To walk in hospital room: A little  Climbing 3-5 steps with railing:  RASHID little  Basic Mobility - Total Score: 22        Education Documentation  Handouts, taught by Katelyn Finley PT at 7/24/2025 11:58 AM.  Learner: Significant Other, Patient  Readiness: Acceptance  Method: Explanation, Demonstration, Handout  Response: Verbalizes Understanding, Demonstrated Understanding    Precautions, taught by Katelyn Finley PT at 7/24/2025 11:58 AM.  Learner: Significant Other, Patient  Readiness: Acceptance  Method: Explanation, Demonstration, Handout  Response: Verbalizes Understanding, Demonstrated Understanding    Body Mechanics, taught by Katelyn Finley PT at 7/24/2025 11:58 AM.  Learner: Significant Other, Patient  Readiness: Acceptance  Method: Explanation, Demonstration, Handout  Response: Verbalizes Understanding, Demonstrated Understanding    Home Exercise Program, taught by Katelyn Finley PT at 7/24/2025 11:58 AM.  Learner: Significant Other, Patient  Readiness: Acceptance  Method: Explanation, Demonstration, Handout  Response: Verbalizes Understanding, Demonstrated Understanding    Mobility Training, taught by Katelyn Finley PT at 7/24/2025 11:58 AM.  Learner: Significant Other, Patient  Readiness: Acceptance  Method: Explanation, Demonstration, Handout  Response: Verbalizes Understanding, Demonstrated Understanding    Education Comments  No comments found.    Katelyn Finley PT, DPT

## 2025-07-28 ENCOUNTER — TELEPHONE (OUTPATIENT)
Dept: ORTHOPEDIC SURGERY | Facility: HOSPITAL | Age: 80
End: 2025-07-28
Payer: MEDICARE

## 2025-07-28 DIAGNOSIS — M19.012 OSTEOARTHRITIS OF LEFT SHOULDER, UNSPECIFIED OSTEOARTHRITIS TYPE: Primary | ICD-10-CM

## 2025-07-28 RX ORDER — OXYCODONE HYDROCHLORIDE 5 MG/1
5 TABLET ORAL EVERY 8 HOURS PRN
Qty: 15 TABLET | Refills: 0 | Status: SHIPPED | OUTPATIENT
Start: 2025-07-28 | End: 2025-08-02

## 2025-07-30 DIAGNOSIS — M19.012 PRIMARY OSTEOARTHRITIS OF LEFT SHOULDER: ICD-10-CM

## 2025-07-30 DIAGNOSIS — M25.512 LEFT SHOULDER PAIN, UNSPECIFIED CHRONICITY: ICD-10-CM

## 2025-07-30 DIAGNOSIS — M25.812 IMPINGEMENT OF LEFT SHOULDER: Primary | ICD-10-CM

## 2025-08-05 ENCOUNTER — OFFICE VISIT (OUTPATIENT)
Dept: ORTHOPEDIC SURGERY | Facility: CLINIC | Age: 80
End: 2025-08-05
Payer: MEDICARE

## 2025-08-05 ENCOUNTER — HOSPITAL ENCOUNTER (OUTPATIENT)
Dept: RADIOLOGY | Facility: CLINIC | Age: 80
Discharge: HOME | End: 2025-08-05
Payer: MEDICARE

## 2025-08-05 DIAGNOSIS — M19.012 OSTEOARTHRITIS OF LEFT SHOULDER, UNSPECIFIED OSTEOARTHRITIS TYPE: Primary | ICD-10-CM

## 2025-08-05 DIAGNOSIS — M25.512 LEFT SHOULDER PAIN, UNSPECIFIED CHRONICITY: ICD-10-CM

## 2025-08-05 DIAGNOSIS — M19.012 PRIMARY OSTEOARTHRITIS OF LEFT SHOULDER: ICD-10-CM

## 2025-08-05 DIAGNOSIS — M25.812 IMPINGEMENT OF LEFT SHOULDER: ICD-10-CM

## 2025-08-05 PROCEDURE — 99024 POSTOP FOLLOW-UP VISIT: CPT | Performed by: PHYSICIAN ASSISTANT

## 2025-08-05 PROCEDURE — 73030 X-RAY EXAM OF SHOULDER: CPT | Mod: LEFT SIDE | Performed by: RADIOLOGY

## 2025-08-05 PROCEDURE — 1159F MED LIST DOCD IN RCRD: CPT | Performed by: PHYSICIAN ASSISTANT

## 2025-08-05 PROCEDURE — 99213 OFFICE O/P EST LOW 20 MIN: CPT | Performed by: PHYSICIAN ASSISTANT

## 2025-08-05 PROCEDURE — 73030 X-RAY EXAM OF SHOULDER: CPT | Mod: LT

## 2025-08-05 PROCEDURE — 1160F RVW MEDS BY RX/DR IN RCRD: CPT | Performed by: PHYSICIAN ASSISTANT

## 2025-08-05 PROCEDURE — 1125F AMNT PAIN NOTED PAIN PRSNT: CPT | Performed by: PHYSICIAN ASSISTANT

## 2025-08-05 ASSESSMENT — PAIN - FUNCTIONAL ASSESSMENT: PAIN_FUNCTIONAL_ASSESSMENT: 0-10

## 2025-08-05 ASSESSMENT — PAIN SCALES - GENERAL: PAINLEVEL_OUTOF10: 6

## 2025-08-06 NOTE — PROGRESS NOTES
Reason for Appointment  Chief Complaint   Patient presents with    Left Shoulder - Post-op     C/o rash under breast- related to atb?     History of Present Illness  Patient is here today 2 weeks status post a left reverse shoulder replacement on 7/23/2025.  X-rays taken today are reviewed and look excellent, no loosening of hardware.  She is still having a similar amount of pain in the shoulder, she has not been doing much in terms of motion, she does have some dependent edema in the hand and mildly in the forearm but she has not been elevating this arm on pillows and has been keeping it down in her sling.  We will get her into formal therapy to get this shoulder moving.  Wound is healing nicely with no signs of infection she does appear to have.  Yeast infection on her chest under her breasts and we discussed keeping this area clean and dry and using some topical antifungals and this should dry out nicely.  We went over home exercises and stretching with her today, she understands no heavy lifting with this arm.  Will follow-up with her in 6 weeks with repeat x-rays of the left shoulder.    Assessment   Encounter Diagnosis   Name Primary?    Osteoarthritis of left shoulder, unspecified osteoarthritis type Yes

## 2025-08-13 ENCOUNTER — APPOINTMENT (OUTPATIENT)
Dept: CARDIOLOGY | Facility: HOSPITAL | Age: 80
End: 2025-08-13
Payer: MEDICARE

## 2025-08-13 ENCOUNTER — APPOINTMENT (OUTPATIENT)
Dept: OPHTHALMOLOGY | Facility: CLINIC | Age: 80
End: 2025-08-13
Payer: MEDICARE

## 2025-08-13 ENCOUNTER — HOSPITAL ENCOUNTER (EMERGENCY)
Facility: HOSPITAL | Age: 80
Discharge: HOME | End: 2025-08-13
Attending: STUDENT IN AN ORGANIZED HEALTH CARE EDUCATION/TRAINING PROGRAM
Payer: MEDICARE

## 2025-08-13 ENCOUNTER — APPOINTMENT (OUTPATIENT)
Dept: RADIOLOGY | Facility: HOSPITAL | Age: 80
End: 2025-08-13
Payer: MEDICARE

## 2025-08-13 ENCOUNTER — TELEPHONE (OUTPATIENT)
Dept: PRIMARY CARE | Facility: CLINIC | Age: 80
End: 2025-08-13

## 2025-08-13 ENCOUNTER — OFFICE VISIT (OUTPATIENT)
Dept: PRIMARY CARE | Facility: CLINIC | Age: 80
End: 2025-08-13
Payer: MEDICARE

## 2025-08-13 VITALS
OXYGEN SATURATION: 98 % | HEART RATE: 74 BPM | SYSTOLIC BLOOD PRESSURE: 120 MMHG | WEIGHT: 206 LBS | BODY MASS INDEX: 37.91 KG/M2 | DIASTOLIC BLOOD PRESSURE: 80 MMHG | HEIGHT: 62 IN

## 2025-08-13 VITALS
HEART RATE: 72 BPM | WEIGHT: 206 LBS | DIASTOLIC BLOOD PRESSURE: 92 MMHG | TEMPERATURE: 97.9 F | SYSTOLIC BLOOD PRESSURE: 131 MMHG | OXYGEN SATURATION: 93 % | RESPIRATION RATE: 22 BRPM | HEIGHT: 62 IN | BODY MASS INDEX: 37.91 KG/M2

## 2025-08-13 DIAGNOSIS — R60.0 EDEMA OF BOTH LOWER EXTREMITIES: ICD-10-CM

## 2025-08-13 DIAGNOSIS — M79.89 LEG SWELLING: Primary | ICD-10-CM

## 2025-08-13 DIAGNOSIS — I48.91 ATRIAL FIBRILLATION WITH RAPID VENTRICULAR RESPONSE (MULTI): ICD-10-CM

## 2025-08-13 DIAGNOSIS — I49.9 IRREGULAR HEART RHYTHM: Primary | ICD-10-CM

## 2025-08-13 LAB
ALBUMIN SERPL BCP-MCNC: 3.7 G/DL (ref 3.4–5)
ALP SERPL-CCNC: 50 U/L (ref 33–136)
ALT SERPL W P-5'-P-CCNC: 5 U/L (ref 7–45)
ANION GAP SERPL CALCULATED.3IONS-SCNC: 10 MMOL/L (ref 10–20)
AST SERPL W P-5'-P-CCNC: 12 U/L (ref 9–39)
BASOPHILS # BLD AUTO: 0.17 X10*3/UL (ref 0–0.1)
BASOPHILS NFR BLD AUTO: 1.9 %
BILIRUB SERPL-MCNC: 0.6 MG/DL (ref 0–1.2)
BNP SERPL-MCNC: 102 PG/ML (ref 0–99)
BUN SERPL-MCNC: 13 MG/DL (ref 6–23)
CALCIUM SERPL-MCNC: 9.1 MG/DL (ref 8.6–10.3)
CARDIAC TROPONIN I PNL SERPL HS: 4 NG/L (ref 0–13)
CARDIAC TROPONIN I PNL SERPL HS: 5 NG/L (ref 0–13)
CHLORIDE SERPL-SCNC: 104 MMOL/L (ref 98–107)
CO2 SERPL-SCNC: 27 MMOL/L (ref 21–32)
CREAT SERPL-MCNC: 0.87 MG/DL (ref 0.5–1.05)
EGFRCR SERPLBLD CKD-EPI 2021: 68 ML/MIN/1.73M*2
EOSINOPHIL # BLD AUTO: 0.72 X10*3/UL (ref 0–0.4)
EOSINOPHIL NFR BLD AUTO: 7.9 %
ERYTHROCYTE [DISTWIDTH] IN BLOOD BY AUTOMATED COUNT: 13.9 % (ref 11.5–14.5)
GLUCOSE SERPL-MCNC: 85 MG/DL (ref 74–99)
HCT VFR BLD AUTO: 39.2 % (ref 36–46)
HGB BLD-MCNC: 13.4 G/DL (ref 12–16)
IMM GRANULOCYTES # BLD AUTO: 0.03 X10*3/UL (ref 0–0.5)
IMM GRANULOCYTES NFR BLD AUTO: 0.3 % (ref 0–0.9)
LYMPHOCYTES # BLD AUTO: 2.86 X10*3/UL (ref 0.8–3)
LYMPHOCYTES NFR BLD AUTO: 31.4 %
MAGNESIUM SERPL-MCNC: 1.97 MG/DL (ref 1.6–2.4)
MCH RBC QN AUTO: 30.3 PG (ref 26–34)
MCHC RBC AUTO-ENTMCNC: 34.2 G/DL (ref 32–36)
MCV RBC AUTO: 89 FL (ref 80–100)
MONOCYTES # BLD AUTO: 0.89 X10*3/UL (ref 0.05–0.8)
MONOCYTES NFR BLD AUTO: 9.8 %
NEUTROPHILS # BLD AUTO: 4.43 X10*3/UL (ref 1.6–5.5)
NEUTROPHILS NFR BLD AUTO: 48.7 %
NRBC BLD-RTO: 0 /100 WBCS (ref 0–0)
PLATELET # BLD AUTO: 267 X10*3/UL (ref 150–450)
POTASSIUM SERPL-SCNC: 4.3 MMOL/L (ref 3.5–5.3)
PROT SERPL-MCNC: 6.4 G/DL (ref 6.4–8.2)
RBC # BLD AUTO: 4.42 X10*6/UL (ref 4–5.2)
SODIUM SERPL-SCNC: 137 MMOL/L (ref 136–145)
WBC # BLD AUTO: 9.1 X10*3/UL (ref 4.4–11.3)

## 2025-08-13 PROCEDURE — 3074F SYST BP LT 130 MM HG: CPT | Performed by: STUDENT IN AN ORGANIZED HEALTH CARE EDUCATION/TRAINING PROGRAM

## 2025-08-13 PROCEDURE — 93000 ELECTROCARDIOGRAM COMPLETE: CPT | Performed by: STUDENT IN AN ORGANIZED HEALTH CARE EDUCATION/TRAINING PROGRAM

## 2025-08-13 PROCEDURE — 1126F AMNT PAIN NOTED NONE PRSNT: CPT | Performed by: STUDENT IN AN ORGANIZED HEALTH CARE EDUCATION/TRAINING PROGRAM

## 2025-08-13 PROCEDURE — 36415 COLL VENOUS BLD VENIPUNCTURE: CPT

## 2025-08-13 PROCEDURE — 71045 X-RAY EXAM CHEST 1 VIEW: CPT

## 2025-08-13 PROCEDURE — 3079F DIAST BP 80-89 MM HG: CPT | Performed by: STUDENT IN AN ORGANIZED HEALTH CARE EDUCATION/TRAINING PROGRAM

## 2025-08-13 PROCEDURE — 93005 ELECTROCARDIOGRAM TRACING: CPT

## 2025-08-13 PROCEDURE — 83880 ASSAY OF NATRIURETIC PEPTIDE: CPT

## 2025-08-13 PROCEDURE — 93971 EXTREMITY STUDY: CPT

## 2025-08-13 PROCEDURE — 99215 OFFICE O/P EST HI 40 MIN: CPT | Performed by: STUDENT IN AN ORGANIZED HEALTH CARE EDUCATION/TRAINING PROGRAM

## 2025-08-13 PROCEDURE — 93971 EXTREMITY STUDY: CPT | Mod: FOREIGN READ | Performed by: RADIOLOGY

## 2025-08-13 PROCEDURE — 80053 COMPREHEN METABOLIC PANEL: CPT

## 2025-08-13 PROCEDURE — 93971 EXTREMITY STUDY: CPT | Mod: XS

## 2025-08-13 PROCEDURE — 85025 COMPLETE CBC W/AUTO DIFF WBC: CPT

## 2025-08-13 PROCEDURE — 83735 ASSAY OF MAGNESIUM: CPT

## 2025-08-13 PROCEDURE — 1159F MED LIST DOCD IN RCRD: CPT | Performed by: STUDENT IN AN ORGANIZED HEALTH CARE EDUCATION/TRAINING PROGRAM

## 2025-08-13 PROCEDURE — 71045 X-RAY EXAM CHEST 1 VIEW: CPT | Mod: FOREIGN READ | Performed by: RADIOLOGY

## 2025-08-13 PROCEDURE — 84484 ASSAY OF TROPONIN QUANT: CPT

## 2025-08-13 PROCEDURE — 99285 EMERGENCY DEPT VISIT HI MDM: CPT | Mod: 25 | Performed by: STUDENT IN AN ORGANIZED HEALTH CARE EDUCATION/TRAINING PROGRAM

## 2025-08-13 RX ORDER — ONDANSETRON 4 MG/1
4 TABLET, ORALLY DISINTEGRATING ORAL ONCE
Status: DISCONTINUED | OUTPATIENT
Start: 2025-08-13 | End: 2025-08-13 | Stop reason: HOSPADM

## 2025-08-13 ASSESSMENT — PATIENT HEALTH QUESTIONNAIRE - PHQ9
2. FEELING DOWN, DEPRESSED OR HOPELESS: NOT AT ALL
1. LITTLE INTEREST OR PLEASURE IN DOING THINGS: NOT AT ALL
SUM OF ALL RESPONSES TO PHQ9 QUESTIONS 1 AND 2: 0

## 2025-08-13 ASSESSMENT — ENCOUNTER SYMPTOMS
PALPITATIONS: 0
FATIGUE: 0
JOINT SWELLING: 1
CHILLS: 0
COUGH: 0
SHORTNESS OF BREATH: 0
FEVER: 0

## 2025-08-13 ASSESSMENT — PAIN DESCRIPTION - PAIN TYPE: TYPE: SURGICAL PAIN

## 2025-08-13 ASSESSMENT — COLUMBIA-SUICIDE SEVERITY RATING SCALE - C-SSRS: 1. IN THE PAST MONTH, HAVE YOU WISHED YOU WERE DEAD OR WISHED YOU COULD GO TO SLEEP AND NOT WAKE UP?: NO

## 2025-08-13 ASSESSMENT — PAIN SCALES - GENERAL
PAINLEVEL_OUTOF10: 0-NO PAIN
PAINLEVEL_OUTOF10: 5 - MODERATE PAIN

## 2025-08-13 ASSESSMENT — PAIN - FUNCTIONAL ASSESSMENT: PAIN_FUNCTIONAL_ASSESSMENT: 0-10

## 2025-08-13 ASSESSMENT — PAIN DESCRIPTION - LOCATION: LOCATION: SHOULDER

## 2025-08-15 ENCOUNTER — EVALUATION (OUTPATIENT)
Dept: OCCUPATIONAL THERAPY | Facility: CLINIC | Age: 80
End: 2025-08-15
Payer: MEDICARE

## 2025-08-15 DIAGNOSIS — Z96.612 STATUS POST REVERSE TOTAL SHOULDER REPLACEMENT, LEFT: ICD-10-CM

## 2025-08-15 DIAGNOSIS — M25.512 ACUTE PAIN OF LEFT SHOULDER: Primary | ICD-10-CM

## 2025-08-15 DIAGNOSIS — M19.012 OSTEOARTHRITIS OF LEFT SHOULDER, UNSPECIFIED OSTEOARTHRITIS TYPE: ICD-10-CM

## 2025-08-15 PROCEDURE — 97165 OT EVAL LOW COMPLEX 30 MIN: CPT | Mod: GO | Performed by: OCCUPATIONAL THERAPIST

## 2025-08-15 PROCEDURE — 97110 THERAPEUTIC EXERCISES: CPT | Mod: GO | Performed by: OCCUPATIONAL THERAPIST

## 2025-08-17 LAB
ATRIAL RATE: 93 BPM
P AXIS: 49 DEGREES
P OFFSET: 216 MS
P ONSET: 156 MS
PR INTERVAL: 142 MS
Q ONSET: 227 MS
QRS COUNT: 15 BEATS
QRS DURATION: 84 MS
QT INTERVAL: 342 MS
QTC CALCULATION(BAZETT): 425 MS
QTC FREDERICIA: 396 MS
R AXIS: 13 DEGREES
T AXIS: 36 DEGREES
T OFFSET: 398 MS
VENTRICULAR RATE: 93 BPM

## 2025-08-29 ENCOUNTER — TREATMENT (OUTPATIENT)
Dept: OCCUPATIONAL THERAPY | Facility: CLINIC | Age: 80
End: 2025-08-29
Payer: MEDICARE

## 2025-08-29 DIAGNOSIS — M25.512 ACUTE PAIN OF LEFT SHOULDER: Primary | ICD-10-CM

## 2025-08-29 DIAGNOSIS — Z96.612 STATUS POST REVERSE TOTAL SHOULDER REPLACEMENT, LEFT: ICD-10-CM

## 2025-08-29 DIAGNOSIS — M19.012 OSTEOARTHRITIS OF LEFT SHOULDER, UNSPECIFIED OSTEOARTHRITIS TYPE: ICD-10-CM

## 2025-08-29 PROCEDURE — 97110 THERAPEUTIC EXERCISES: CPT | Mod: GO | Performed by: OCCUPATIONAL THERAPIST

## 2025-11-04 ENCOUNTER — APPOINTMENT (OUTPATIENT)
Dept: OPHTHALMOLOGY | Facility: CLINIC | Age: 80
End: 2025-11-04
Payer: MEDICARE

## (undated) DEVICE — ADHESIVE, DERMABOND, PRINEO, 12 X 9, STERILE

## (undated) DEVICE — SUTURE, ETHIBOND, P2, V-37, 30 IN, GREEN

## (undated) DEVICE — 3.0MM ARTHREX MGS DRILL BIT, STERILE

## (undated) DEVICE — HOOD, SURGICAL, FLYTE HYBRID

## (undated) DEVICE — Device

## (undated) DEVICE — MASK, FACE, TENET, FOAM POSITIONING, DISPOSABLE

## (undated) DEVICE — WOUND SYSTEM, DEBRIDEMENT & CLEANING, O.R DUOPAK

## (undated) DEVICE — DRAPE, INCISE, ANTIMICROBIAL, IOBAN 2, 13 X 13 IN, DISPOSABLE, STERILE

## (undated) DEVICE — BLANKET, LOWER BODY, VHA PLUS, ADULT

## (undated) DEVICE — GLOVE, PROTEXIS PI CLASSIC, SZ-6.5, PF, LF

## (undated) DEVICE — ARTHREX REAMER HEAD, ANGLED, SMALL

## (undated) DEVICE — DRAPE, INSTRUMENT, W/POUCH, STERI DRAPE, 7 X 11 IN, DISPOSABLE, STERILE

## (undated) DEVICE — CLOSURE SYSTEM, DERMABOND, PRINEO, 22CM, STERILE

## (undated) DEVICE — BLADE, SAW, SAGITTAL, 25.0 X 1.27 X 90MM

## (undated) DEVICE — GLOVE, PROTEXIS PI CLASSIC, SZ-7.5, PF, LF

## (undated) DEVICE — COVER, BACK TABLE, 65 X 90, HVY REINFORCED

## (undated) DEVICE — GLOVE, SURGICAL, PROTEXIS PI BLUE W/NEUTHERA, 7.0, PF, LF

## (undated) DEVICE — SUTURE, MONOCRYL, 3-0, 27 IN, PS-2, UNDYED